# Patient Record
Sex: FEMALE | Race: WHITE | NOT HISPANIC OR LATINO
[De-identification: names, ages, dates, MRNs, and addresses within clinical notes are randomized per-mention and may not be internally consistent; named-entity substitution may affect disease eponyms.]

---

## 2017-03-08 ENCOUNTER — OTHER (OUTPATIENT)
Age: 37
End: 2017-03-08

## 2017-03-15 ENCOUNTER — LABORATORY RESULT (OUTPATIENT)
Age: 37
End: 2017-03-15

## 2017-03-15 ENCOUNTER — APPOINTMENT (OUTPATIENT)
Dept: MATERNAL FETAL MEDICINE | Facility: CLINIC | Age: 37
End: 2017-03-15

## 2017-03-15 ENCOUNTER — OTHER (OUTPATIENT)
Age: 37
End: 2017-03-15

## 2017-03-15 VITALS
HEIGHT: 66 IN | BODY MASS INDEX: 28.64 KG/M2 | SYSTOLIC BLOOD PRESSURE: 132 MMHG | DIASTOLIC BLOOD PRESSURE: 86 MMHG | WEIGHT: 178.2 LBS

## 2017-03-15 DIAGNOSIS — Z00.00 ENCOUNTER FOR GENERAL ADULT MEDICAL EXAMINATION W/OUT ABNORMAL FINDINGS: ICD-10-CM

## 2017-03-15 DIAGNOSIS — Z31.69 ENCOUNTER FOR OTHER GENERAL COUNSELING AND ADVICE ON PROCREATION: ICD-10-CM

## 2017-03-29 ENCOUNTER — APPOINTMENT (OUTPATIENT)
Dept: ULTRASOUND IMAGING | Facility: HOSPITAL | Age: 37
End: 2017-03-29

## 2017-03-29 ENCOUNTER — OUTPATIENT (OUTPATIENT)
Dept: OUTPATIENT SERVICES | Facility: HOSPITAL | Age: 37
LOS: 1 days | End: 2017-03-29
Payer: COMMERCIAL

## 2017-03-29 DIAGNOSIS — Z31.69 ENCOUNTER FOR OTHER GENERAL COUNSELING AND ADVICE ON PROCREATION: ICD-10-CM

## 2017-03-29 PROCEDURE — 58340 CATHETER FOR HYSTEROGRAPHY: CPT

## 2017-03-29 PROCEDURE — 76831 ECHO EXAM UTERUS: CPT

## 2017-05-24 LAB
ALBUMIN SERPL ELPH-MCNC: 4.6 G/DL
ALP BLD-CCNC: 64 U/L
ALT SERPL-CCNC: 11 U/L
ANION GAP SERPL CALC-SCNC: 16 MMOL/L
AST SERPL-CCNC: 15 U/L
BASOPHILS # BLD AUTO: 0.04 K/UL
BASOPHILS NFR BLD AUTO: 0.3 %
BILIRUB SERPL-MCNC: 0.3 MG/DL
BUN SERPL-MCNC: 15 MG/DL
CALCIUM SERPL-MCNC: 9.9 MG/DL
CHLORIDE SERPL-SCNC: 103 MMOL/L
CO2 SERPL-SCNC: 20 MMOL/L
CREAT SERPL-MCNC: 0.78 MG/DL
EOSINOPHIL # BLD AUTO: 0.61 K/UL
EOSINOPHIL NFR BLD AUTO: 5.2 %
GLUCOSE SERPL-MCNC: 83 MG/DL
HBV SURFACE AG SER QL: NONREACTIVE
HCT VFR BLD CALC: 42.5 %
HCV AB SER QL: NONREACTIVE
HCV S/CO RATIO: 0.09 S/CO
HGB BLD-MCNC: 14.6 G/DL
HIV1+2 AB SPEC QL IA.RAPID: NONREACTIVE
IMM GRANULOCYTES NFR BLD AUTO: 0.1 %
LYMPHOCYTES # BLD AUTO: 4.23 K/UL
LYMPHOCYTES NFR BLD AUTO: 36.3 %
MAN DIFF?: NORMAL
MCHC RBC-ENTMCNC: 29.7 PG
MCHC RBC-ENTMCNC: 34.4 GM/DL
MCV RBC AUTO: 86.6 FL
MEV IGG FLD QL IA: 17.9 AU/ML
MEV IGG+IGM SER-IMP: NEGATIVE
MONOCYTES # BLD AUTO: 0.85 K/UL
MONOCYTES NFR BLD AUTO: 7.3 %
MUV AB SER-ACNC: POSITIVE
MUV IGG SER QL IA: 13.5 AU/ML
NEUTROPHILS # BLD AUTO: 5.91 K/UL
NEUTROPHILS NFR BLD AUTO: 50.8 %
PLATELET # BLD AUTO: 287 K/UL
POTASSIUM SERPL-SCNC: 4.3 MMOL/L
PROT SERPL-MCNC: 7.3 G/DL
RBC # BLD: 4.91 M/UL
RBC # FLD: 13.3 %
RPR SER-TITR: NORMAL
RUBV IGG FLD-ACNC: 1.1 INDEX
RUBV IGG SER-IMP: POSITIVE
SODIUM SERPL-SCNC: 138 MMOL/L
TSH SERPL-ACNC: 1.78 UIU/ML
VZV AB TITR SER: POSITIVE
VZV IGG SER IF-ACNC: 404.1 INDEX
WBC # FLD AUTO: 11.65 K/UL

## 2017-11-13 ENCOUNTER — EMERGENCY (EMERGENCY)
Facility: HOSPITAL | Age: 37
LOS: 1 days | Discharge: HOME | End: 2017-11-13
Attending: EMERGENCY MEDICINE | Admitting: EMERGENCY MEDICINE
Payer: COMMERCIAL

## 2017-11-13 VITALS
DIASTOLIC BLOOD PRESSURE: 102 MMHG | OXYGEN SATURATION: 100 % | HEART RATE: 86 BPM | RESPIRATION RATE: 18 BRPM | SYSTOLIC BLOOD PRESSURE: 149 MMHG | TEMPERATURE: 98 F

## 2017-11-13 VITALS — HEART RATE: 74 BPM | DIASTOLIC BLOOD PRESSURE: 80 MMHG | SYSTOLIC BLOOD PRESSURE: 115 MMHG

## 2017-11-13 LAB
ALBUMIN SERPL ELPH-MCNC: 5 G/DL — SIGNIFICANT CHANGE UP (ref 3.3–5)
ALP SERPL-CCNC: 56 U/L — SIGNIFICANT CHANGE UP (ref 40–120)
ALT FLD-CCNC: 15 U/L RC — SIGNIFICANT CHANGE UP (ref 10–45)
ANION GAP SERPL CALC-SCNC: 14 MMOL/L — SIGNIFICANT CHANGE UP (ref 5–17)
AST SERPL-CCNC: 22 U/L — SIGNIFICANT CHANGE UP (ref 10–40)
BILIRUB SERPL-MCNC: 0.4 MG/DL — SIGNIFICANT CHANGE UP (ref 0.2–1.2)
BUN SERPL-MCNC: 11 MG/DL — SIGNIFICANT CHANGE UP (ref 7–23)
CALCIUM SERPL-MCNC: 10 MG/DL — SIGNIFICANT CHANGE UP (ref 8.4–10.5)
CHLORIDE SERPL-SCNC: 104 MMOL/L — SIGNIFICANT CHANGE UP (ref 96–108)
CO2 SERPL-SCNC: 23 MMOL/L — SIGNIFICANT CHANGE UP (ref 22–31)
CREAT SERPL-MCNC: 0.76 MG/DL — SIGNIFICANT CHANGE UP (ref 0.5–1.3)
GLUCOSE SERPL-MCNC: 84 MG/DL — SIGNIFICANT CHANGE UP (ref 70–99)
HCG UR QL: NEGATIVE — SIGNIFICANT CHANGE UP
POTASSIUM SERPL-MCNC: 4.4 MMOL/L — SIGNIFICANT CHANGE UP (ref 3.5–5.3)
POTASSIUM SERPL-SCNC: 4.4 MMOL/L — SIGNIFICANT CHANGE UP (ref 3.5–5.3)
PROT SERPL-MCNC: 7.4 G/DL — SIGNIFICANT CHANGE UP (ref 6–8.3)
SODIUM SERPL-SCNC: 141 MMOL/L — SIGNIFICANT CHANGE UP (ref 135–145)

## 2017-11-13 PROCEDURE — 99284 EMERGENCY DEPT VISIT MOD MDM: CPT

## 2017-11-13 PROCEDURE — 96375 TX/PRO/DX INJ NEW DRUG ADDON: CPT

## 2017-11-13 PROCEDURE — 80053 COMPREHEN METABOLIC PANEL: CPT

## 2017-11-13 PROCEDURE — 99284 EMERGENCY DEPT VISIT MOD MDM: CPT | Mod: 25

## 2017-11-13 PROCEDURE — 81025 URINE PREGNANCY TEST: CPT

## 2017-11-13 PROCEDURE — 96374 THER/PROPH/DIAG INJ IV PUSH: CPT

## 2017-11-13 RX ORDER — MAGNESIUM SULFATE 500 MG/ML
2 VIAL (ML) INJECTION ONCE
Qty: 0 | Refills: 0 | Status: DISCONTINUED | OUTPATIENT
Start: 2017-11-13 | End: 2017-11-13

## 2017-11-13 RX ORDER — METOCLOPRAMIDE HCL 10 MG
10 TABLET ORAL ONCE
Qty: 0 | Refills: 0 | Status: COMPLETED | OUTPATIENT
Start: 2017-11-13 | End: 2017-11-13

## 2017-11-13 RX ORDER — ACETAMINOPHEN 500 MG
1000 TABLET ORAL ONCE
Qty: 0 | Refills: 0 | Status: COMPLETED | OUTPATIENT
Start: 2017-11-13 | End: 2017-11-13

## 2017-11-13 RX ORDER — SODIUM CHLORIDE 9 MG/ML
2000 INJECTION INTRAMUSCULAR; INTRAVENOUS; SUBCUTANEOUS ONCE
Qty: 0 | Refills: 0 | Status: COMPLETED | OUTPATIENT
Start: 2017-11-13 | End: 2017-11-13

## 2017-11-13 RX ADMIN — SODIUM CHLORIDE 1000 MILLILITER(S): 9 INJECTION INTRAMUSCULAR; INTRAVENOUS; SUBCUTANEOUS at 16:45

## 2017-11-13 RX ADMIN — Medication 400 MILLIGRAM(S): at 16:44

## 2017-11-13 RX ADMIN — Medication 1000 MILLIGRAM(S): at 17:45

## 2017-11-13 RX ADMIN — Medication 10 MILLIGRAM(S): at 16:50

## 2017-11-13 NOTE — ED PROVIDER NOTE - OBJECTIVE STATEMENT
36 yo F hx pre-eclampsia presenting with elevated BP, headaches x 8 d. bilateral frontal headache Associated nausea, no vomiting, "fuzzy" vision but denies blurry/double vision. + photophobia, no phonophobia. + increased stressors in life.    LMP 2 weeks ago, denies OCP use.

## 2017-11-13 NOTE — ED PROVIDER NOTE - CRANIAL NERVE AND PUPILLARY EXAM
sensation of room spinning with lateral EOM/cranial nerves 2-12 intact/central and peripheral vision intact/central vision intact/extra-ocular movements intact

## 2017-11-13 NOTE — ED ADULT NURSE NOTE - OBJECTIVE STATEMENT
37y female presents to ED ambulatory and a/ox3 complaining of headaches and blurry vision. Pt states she started having severe headaches 7 days ago intermittently, starting from the base of the neck and coming towards her forehead, 8/10 non radiating aching pain. Pt states that she has felt very fuzzy and has intermittent nausea, chills and SOB accompanied with headache. Pt states she also has numbness and tingling going down both upper and lower extremities. PERRL, equal strength bilaterally, with sensation present bilaterally. Pt states that she has photophobia and trouble concentrating at work. Pt denies chest pain, denies v/d, denies weakness and dizziness. Pt states she is under stress and going through the grieving process currently. Lenny at bedside, will continue to reassess.

## 2017-11-13 NOTE — ED PROVIDER NOTE - ATTENDING CONTRIBUTION TO CARE
37 yof pmhx post-partum pre-ecclampsia w htn in prior pregnancy (last pregnancy over one year ago), otherwise no hx of chronic htn, presnts w feelign of dizziness, diffuse headache startign in occipital region and radiating upward. no hx of headaches in past besides very mild ones, states this ha started gradually 8 days ago, constant, some improvement w advil. mild blurriness in vision. states felt weak and "nearly passed out." states she lost her youngest child one year ago, and is curently in the proces of adopting a new child -  has been feeling a lot of anger over her child's death, and a lot of unresolved issues with her new adoption.  has been under a lot of stress recently.     ROS:   constitutional - no fever, no chills  eyes - no visual changes, no redness  eent - no sore throat, no nasal congestion  cvs - no chest pain, no leg swelling  resp - no shortness of breath, no cough  gi - no abdominal pain, no vomiting, no diarrhea  gu - no dysuria, no hematuria  msk - no acute back pain, no joint swelling  skin - no rashes, no jaundice  neuro - + headache, no focal weakness  psych - no acute mental health issue     Physical Exam:   constitutional - well appearing, awake and alert, oriented x3  head - no external evidence of trauma  cvs - rrr, no murmurs, no peripheral edema  resp - breath sounds clear and equal bilat  gi - abdomen soft and nontender, no rigidity, guarding or rebound, bowel sounds present  msk - moving all extremities spontaneously  neuro - alert and oriented x3, no focal deficits, CNs 2-12 grossly intact. neg rombergs, no pronator drift, strength 5/5 in all ext. no nystagmus, pupils leatha.   skin- no jaundice, warm and dry  psych - mood and affect wnl, no apparent risk to self or others . mildly tearful when talking about personal life    pt quite HTN in ED - question whether secondary to pain, or whether underlying htn is causing her sxs. HERBERT Richardson MD 37 yof pmhx post-partum  htn after prior pregnancy requiring po meds for a few months post partum (last pregnancy over one year ago), otherwise no hx of chronic htn, presnts w feelign of dizziness, diffuse headache starting in occipital region and radiating upward. no hx of headaches in past besides very mild ones, states this ha started gradually 8 days ago, constant, some improvement w advil. mild blurriness in vision.  felt weak and "nearly passed out." states she lost her youngest child one year ago, and is currently in the process of adopting a new child -  has been feeling a lot of anger over her child's death, and a lot of unresolved issues with her new adoption.  has been under a lot of stress recently and has been looking at a computer screen a lot filling out adoption paperwork. works as a .      ROS:   constitutional - no fever, no chills  eyes - no visual changes, no redness  eent - no sore throat, no nasal congestion  cvs - no chest pain, no leg swelling  resp - no shortness of breath, no cough  gi - no abdominal pain, no vomiting, no diarrhea  gu - no dysuria, no hematuria  msk - no acute back pain, no joint swelling  skin - no rashes, no jaundice  neuro - + headache, no focal weakness  psych - no acute mental health issue     Physical Exam:   constitutional - well appearing, awake and alert, oriented x3  head - no external evidence of trauma  cvs - rrr, no murmurs, no peripheral edema  resp - breath sounds clear and equal bilat  gi - abdomen soft and nontender, no rigidity, guarding or rebound, bowel sounds present  msk - moving all extremities spontaneously  neuro - alert and oriented x3, no focal deficits, CNs 2-12 grossly intact. neg rombergs, no pronator drift, strength 5/5 in all ext. no nystagmus, pupils leatha.   skin- no jaundice, warm and dry  psych - mood and affect wnl, no apparent risk to self or others . mildly tearful when talking about personal life    pt w hypertension in ED - question whether secondary to pain, or whether underlying htn is causing her sxs. no focal neuro deficits to suggest intracranial injury/ acute pathology. no meningismus, change in mental status or fever to suggest infection. iv meds for headache w marked improvement in sxs as well as return to normal of pt's BP. pt states she and her  have a therapist they are seeing to cope w their child's death. counseled to f/u w neuro if ha worsens. additional verbal instructions regarding diagnosis, return precautions and follow up plan given to pt and/or family.  HERBERT Richardson MD

## 2018-03-10 ENCOUNTER — INPATIENT (INPATIENT)
Facility: HOSPITAL | Age: 38
LOS: 0 days | Discharge: HOME | DRG: 777 | End: 2018-03-10
Attending: OBSTETRICS & GYNECOLOGY | Admitting: OBSTETRICS & GYNECOLOGY
Payer: SELF-PAY

## 2018-03-10 ENCOUNTER — TRANSCRIPTION ENCOUNTER (OUTPATIENT)
Age: 38
End: 2018-03-10

## 2018-03-10 ENCOUNTER — RESULT REVIEW (OUTPATIENT)
Age: 38
End: 2018-03-10

## 2018-03-10 VITALS
WEIGHT: 175.05 LBS | HEART RATE: 97 BPM | OXYGEN SATURATION: 97 % | TEMPERATURE: 99 F | SYSTOLIC BLOOD PRESSURE: 132 MMHG | DIASTOLIC BLOOD PRESSURE: 83 MMHG | HEIGHT: 66 IN | RESPIRATION RATE: 18 BRPM

## 2018-03-10 VITALS
SYSTOLIC BLOOD PRESSURE: 128 MMHG | HEART RATE: 87 BPM | DIASTOLIC BLOOD PRESSURE: 79 MMHG | OXYGEN SATURATION: 100 % | RESPIRATION RATE: 18 BRPM

## 2018-03-10 DIAGNOSIS — R10.32 LEFT LOWER QUADRANT PAIN: ICD-10-CM

## 2018-03-10 DIAGNOSIS — Z34.90 ENCOUNTER FOR SUPERVISION OF NORMAL PREGNANCY, UNSPECIFIED, UNSPECIFIED TRIMESTER: ICD-10-CM

## 2018-03-10 LAB
ALBUMIN SERPL ELPH-MCNC: 4.4 G/DL — SIGNIFICANT CHANGE UP (ref 3.3–5)
ALP SERPL-CCNC: 51 U/L — SIGNIFICANT CHANGE UP (ref 40–120)
ALT FLD-CCNC: 12 U/L RC — SIGNIFICANT CHANGE UP (ref 10–45)
ANION GAP SERPL CALC-SCNC: 14 MMOL/L — SIGNIFICANT CHANGE UP (ref 5–17)
APPEARANCE UR: CLEAR — SIGNIFICANT CHANGE UP
APTT BLD: 28.1 SEC — SIGNIFICANT CHANGE UP (ref 27.5–37.4)
AST SERPL-CCNC: 18 U/L — SIGNIFICANT CHANGE UP (ref 10–40)
BACTERIA # UR AUTO: ABNORMAL /HPF
BASE EXCESS BLDV CALC-SCNC: -0.3 MMOL/L — SIGNIFICANT CHANGE UP (ref -2–2)
BASOPHILS # BLD AUTO: 0.1 K/UL — SIGNIFICANT CHANGE UP (ref 0–0.2)
BASOPHILS NFR BLD AUTO: 0.6 % — SIGNIFICANT CHANGE UP (ref 0–2)
BILIRUB SERPL-MCNC: 0.3 MG/DL — SIGNIFICANT CHANGE UP (ref 0.2–1.2)
BILIRUB UR-MCNC: NEGATIVE — SIGNIFICANT CHANGE UP
BLD GP AB SCN SERPL QL: NEGATIVE — SIGNIFICANT CHANGE UP
BUN SERPL-MCNC: 14 MG/DL — SIGNIFICANT CHANGE UP (ref 7–23)
CA-I SERPL-SCNC: 1.17 MMOL/L — SIGNIFICANT CHANGE UP (ref 1.12–1.3)
CALCIUM SERPL-MCNC: 9.9 MG/DL — SIGNIFICANT CHANGE UP (ref 8.4–10.5)
CHLORIDE BLDV-SCNC: 112 MMOL/L — HIGH (ref 96–108)
CHLORIDE SERPL-SCNC: 105 MMOL/L — SIGNIFICANT CHANGE UP (ref 96–108)
CO2 BLDV-SCNC: 26 MMOL/L — SIGNIFICANT CHANGE UP (ref 22–30)
CO2 SERPL-SCNC: 22 MMOL/L — SIGNIFICANT CHANGE UP (ref 22–31)
COLOR SPEC: SIGNIFICANT CHANGE UP
CREAT SERPL-MCNC: 0.75 MG/DL — SIGNIFICANT CHANGE UP (ref 0.5–1.3)
DIFF PNL FLD: ABNORMAL
EOSINOPHIL # BLD AUTO: 0.3 K/UL — SIGNIFICANT CHANGE UP (ref 0–0.5)
EOSINOPHIL NFR BLD AUTO: 2.7 % — SIGNIFICANT CHANGE UP (ref 0–6)
EPI CELLS # UR: SIGNIFICANT CHANGE UP /HPF
GAS PNL BLDV: 133 MMOL/L — LOW (ref 136–145)
GAS PNL BLDV: SIGNIFICANT CHANGE UP
GAS PNL BLDV: SIGNIFICANT CHANGE UP
GLUCOSE BLDV-MCNC: 77 MG/DL — SIGNIFICANT CHANGE UP (ref 70–99)
GLUCOSE SERPL-MCNC: 87 MG/DL — SIGNIFICANT CHANGE UP (ref 70–99)
GLUCOSE UR QL: NEGATIVE — SIGNIFICANT CHANGE UP
HCG SERPL-ACNC: 4595 MIU/ML — HIGH (ref 5–24)
HCO3 BLDV-SCNC: 25 MMOL/L — SIGNIFICANT CHANGE UP (ref 21–29)
HCT VFR BLD CALC: 41.8 % — SIGNIFICANT CHANGE UP (ref 34.5–45)
HCT VFR BLDA CALC: 44 % — SIGNIFICANT CHANGE UP (ref 39–50)
HGB BLD CALC-MCNC: 14.5 G/DL — SIGNIFICANT CHANGE UP (ref 11.5–15.5)
HGB BLD-MCNC: 14.4 G/DL — SIGNIFICANT CHANGE UP (ref 11.5–15.5)
INR BLD: 1.04 RATIO — SIGNIFICANT CHANGE UP (ref 0.88–1.16)
KETONES UR-MCNC: NEGATIVE — SIGNIFICANT CHANGE UP
LACTATE BLDV-MCNC: 1.1 MMOL/L — SIGNIFICANT CHANGE UP (ref 0.7–2)
LEUKOCYTE ESTERASE UR-ACNC: NEGATIVE — SIGNIFICANT CHANGE UP
LYMPHOCYTES # BLD AUTO: 3.3 K/UL — SIGNIFICANT CHANGE UP (ref 1–3.3)
LYMPHOCYTES # BLD AUTO: 31.8 % — SIGNIFICANT CHANGE UP (ref 13–44)
MCHC RBC-ENTMCNC: 30.8 PG — SIGNIFICANT CHANGE UP (ref 27–34)
MCHC RBC-ENTMCNC: 34.5 GM/DL — SIGNIFICANT CHANGE UP (ref 32–36)
MCV RBC AUTO: 89.2 FL — SIGNIFICANT CHANGE UP (ref 80–100)
MONOCYTES # BLD AUTO: 0.9 K/UL — SIGNIFICANT CHANGE UP (ref 0–0.9)
MONOCYTES NFR BLD AUTO: 8.2 % — SIGNIFICANT CHANGE UP (ref 2–14)
NEUTROPHILS # BLD AUTO: 5.9 K/UL — SIGNIFICANT CHANGE UP (ref 1.8–7.4)
NEUTROPHILS NFR BLD AUTO: 56.6 % — SIGNIFICANT CHANGE UP (ref 43–77)
NITRITE UR-MCNC: NEGATIVE — SIGNIFICANT CHANGE UP
OTHER CELLS CSF MANUAL: 8 ML/DL — LOW (ref 18–22)
PCO2 BLDV: 44 MMHG — SIGNIFICANT CHANGE UP (ref 35–50)
PH BLDV: 7.37 — SIGNIFICANT CHANGE UP (ref 7.35–7.45)
PH UR: 6.5 — SIGNIFICANT CHANGE UP (ref 5–8)
PLATELET # BLD AUTO: 271 K/UL — SIGNIFICANT CHANGE UP (ref 150–400)
PO2 BLDV: 24 MMHG — LOW (ref 25–45)
POTASSIUM BLDV-SCNC: 4.7 MMOL/L — SIGNIFICANT CHANGE UP (ref 3.5–5)
POTASSIUM SERPL-MCNC: 3.9 MMOL/L — SIGNIFICANT CHANGE UP (ref 3.5–5.3)
POTASSIUM SERPL-SCNC: 3.9 MMOL/L — SIGNIFICANT CHANGE UP (ref 3.5–5.3)
PROT SERPL-MCNC: 7.5 G/DL — SIGNIFICANT CHANGE UP (ref 6–8.3)
PROT UR-MCNC: NEGATIVE — SIGNIFICANT CHANGE UP
PROTHROM AB SERPL-ACNC: 11.3 SEC — SIGNIFICANT CHANGE UP (ref 9.8–12.7)
RBC # BLD: 4.69 M/UL — SIGNIFICANT CHANGE UP (ref 3.8–5.2)
RBC # FLD: 11.6 % — SIGNIFICANT CHANGE UP (ref 10.3–14.5)
RBC CASTS # UR COMP ASSIST: SIGNIFICANT CHANGE UP /HPF (ref 0–2)
RH IG SCN BLD-IMP: POSITIVE — SIGNIFICANT CHANGE UP
SAO2 % BLDV: 43 % — LOW (ref 67–88)
SODIUM SERPL-SCNC: 141 MMOL/L — SIGNIFICANT CHANGE UP (ref 135–145)
SP GR SPEC: 1.01 — LOW (ref 1.01–1.02)
UROBILINOGEN FLD QL: NEGATIVE — SIGNIFICANT CHANGE UP
WBC # BLD: 10.4 K/UL — SIGNIFICANT CHANGE UP (ref 3.8–10.5)
WBC # FLD AUTO: 10.4 K/UL — SIGNIFICANT CHANGE UP (ref 3.8–10.5)
WBC UR QL: SIGNIFICANT CHANGE UP /HPF (ref 0–5)

## 2018-03-10 PROCEDURE — 85610 PROTHROMBIN TIME: CPT

## 2018-03-10 PROCEDURE — 99285 EMERGENCY DEPT VISIT HI MDM: CPT | Mod: 25

## 2018-03-10 PROCEDURE — 86901 BLOOD TYPING SEROLOGIC RH(D): CPT

## 2018-03-10 PROCEDURE — 99285 EMERGENCY DEPT VISIT HI MDM: CPT

## 2018-03-10 PROCEDURE — 86900 BLOOD TYPING SEROLOGIC ABO: CPT

## 2018-03-10 PROCEDURE — 76817 TRANSVAGINAL US OBSTETRIC: CPT | Mod: 26

## 2018-03-10 PROCEDURE — 86850 RBC ANTIBODY SCREEN: CPT

## 2018-03-10 PROCEDURE — 76817 TRANSVAGINAL US OBSTETRIC: CPT

## 2018-03-10 PROCEDURE — 80053 COMPREHEN METABOLIC PANEL: CPT

## 2018-03-10 PROCEDURE — 84702 CHORIONIC GONADOTROPIN TEST: CPT

## 2018-03-10 PROCEDURE — 82947 ASSAY GLUCOSE BLOOD QUANT: CPT

## 2018-03-10 PROCEDURE — 85730 THROMBOPLASTIN TIME PARTIAL: CPT

## 2018-03-10 PROCEDURE — 82330 ASSAY OF CALCIUM: CPT

## 2018-03-10 PROCEDURE — 85014 HEMATOCRIT: CPT

## 2018-03-10 PROCEDURE — 84295 ASSAY OF SERUM SODIUM: CPT

## 2018-03-10 PROCEDURE — 93975 VASCULAR STUDY: CPT | Mod: 26

## 2018-03-10 PROCEDURE — 93975 VASCULAR STUDY: CPT

## 2018-03-10 PROCEDURE — 88305 TISSUE EXAM BY PATHOLOGIST: CPT | Mod: 26

## 2018-03-10 PROCEDURE — 82803 BLOOD GASES ANY COMBINATION: CPT

## 2018-03-10 PROCEDURE — 81001 URINALYSIS AUTO W/SCOPE: CPT

## 2018-03-10 PROCEDURE — 83605 ASSAY OF LACTIC ACID: CPT

## 2018-03-10 PROCEDURE — 84132 ASSAY OF SERUM POTASSIUM: CPT

## 2018-03-10 PROCEDURE — 82435 ASSAY OF BLOOD CHLORIDE: CPT

## 2018-03-10 PROCEDURE — 88305 TISSUE EXAM BY PATHOLOGIST: CPT

## 2018-03-10 PROCEDURE — 85027 COMPLETE CBC AUTOMATED: CPT

## 2018-03-10 RX ORDER — ONDANSETRON 8 MG/1
4 TABLET, FILM COATED ORAL ONCE
Qty: 0 | Refills: 0 | Status: DISCONTINUED | OUTPATIENT
Start: 2018-03-10 | End: 2018-03-10

## 2018-03-10 RX ORDER — OXYCODONE HYDROCHLORIDE 5 MG/1
1 TABLET ORAL
Qty: 8 | Refills: 0 | OUTPATIENT
Start: 2018-03-10 | End: 2018-03-11

## 2018-03-10 RX ORDER — SODIUM CHLORIDE 9 MG/ML
1000 INJECTION, SOLUTION INTRAVENOUS
Qty: 0 | Refills: 0 | Status: DISCONTINUED | OUTPATIENT
Start: 2018-03-10 | End: 2018-03-10

## 2018-03-10 RX ORDER — SODIUM CHLORIDE 9 MG/ML
1000 INJECTION INTRAMUSCULAR; INTRAVENOUS; SUBCUTANEOUS ONCE
Qty: 0 | Refills: 0 | Status: COMPLETED | OUTPATIENT
Start: 2018-03-10 | End: 2018-03-10

## 2018-03-10 RX ORDER — HYDROMORPHONE HYDROCHLORIDE 2 MG/ML
0.5 INJECTION INTRAMUSCULAR; INTRAVENOUS; SUBCUTANEOUS
Qty: 0 | Refills: 0 | Status: DISCONTINUED | OUTPATIENT
Start: 2018-03-10 | End: 2018-03-10

## 2018-03-10 RX ORDER — ACETAMINOPHEN 500 MG
650 TABLET ORAL ONCE
Qty: 0 | Refills: 0 | Status: COMPLETED | OUTPATIENT
Start: 2018-03-10 | End: 2018-03-10

## 2018-03-10 RX ADMIN — SODIUM CHLORIDE 4000 MILLILITER(S): 9 INJECTION INTRAMUSCULAR; INTRAVENOUS; SUBCUTANEOUS at 12:05

## 2018-03-10 RX ADMIN — Medication 650 MILLIGRAM(S): at 12:06

## 2018-03-10 NOTE — ED PROVIDER NOTE - PHYSICAL EXAMINATION
Gen: NAD, AOx3  Head: NCAT  HEENT: PERRL, oral mucosa moist, normal conjunctiva, neck supple  Lung: CTAB, no respiratory distress  CV: rrr, no murmur, Normal perfusion  Abd: soft, +RLQ/suprapubic/LLQ ttp without rebound/guarding, no CVA ttp  MSK: No edema, no visible deformities  Neuro: No focal neurologic deficits  Skin: No rash   Psych: normal affect

## 2018-03-10 NOTE — ASU DISCHARGE PLAN (ADULT/PEDIATRIC). - ACTIVITY LEVEL
no sports/gym/no exercise/nothing per rectum/no tampons/no tub baths/nothing per vagina/no heavy lifting/no weight bearing/quiet play/no douching/no intercourse

## 2018-03-10 NOTE — ED PROVIDER NOTE - ATTENDING CONTRIBUTION TO CARE
Dr. Ochoa (Attending Physician)  I performed a history and physical exam of the patient and discussed their management with the resident. I reviewed the resident's note and agree with the documented findings and plan of care. My medical decision making and observations are found above.

## 2018-03-10 NOTE — CONSULT NOTE ADULT - SUBJECTIVE AND OBJECTIVE BOX
37y  LMP 18 c/o abdominal pain that began_____. Patient has been following with Ob/Gyn for pregnancy of unknown locations.  Hc (3/5)-> 3519 (3/8) -> 4392 (3/9)  TVUS performed on 3/9 demonstrated right ovarian complex cyst 1.3 x 1.1 x 1.3cm, thickened endometrium, no free fluid, no adnexal masses.      Ob=Dr. Padilla    OB/GYN HISTORY:  -TOP, -pLTCS, 2016-rLTCS @35 weeks- c/b vasa previa in 3rd trimester (infant  suddenly at home @ 7 weeks)  PAST MEDICAL & SURGICAL HISTORY:  Hypertension in pregnancy  No Past Medical History  No Past Surgical History    Allergies    No Known Allergies    Intolerances      MEDICATIONS  (STANDING):    MEDICATIONS  (PRN):    FAMILY HISTORY:  No pertinent family history in first degree relatives    SOCIAL HISTORY:      Vital Signs Last 24 Hrs  T(C): --  T(F): --  HR: --  BP: --  BP(mean): --  RR: --  SpO2: --    PHYSICAL EXAM:            LABS:                    RADIOLOGY & ADDITIONAL STUDIES: 37y  LMP 18 c/o abdominal pain that began_____. Patient has been following with Ob/Gyn for pregnancy of unknown location.  Hc (3/5)-> 3519 (3/8) -> 4392 (3/9)  TVUS performed on 3/9 demonstrated right ovarian complex cyst 1.3 x 1.1 x 1.3cm, thickened endometrium, no free fluid, no adnexal masses.      Ob=Dr. Padilla    OB/GYN HISTORY:  -TOP, -pLTCS, 2016-rLTCS @35 weeks- c/b vasa previa in 3rd trimester (infant  suddenly at home @ 7 weeks)  PAST MEDICAL & SURGICAL HISTORY:  Hypertension in pregnancy  No Past Medical History  No Past Surgical History    Allergies    No Known Allergies    Intolerances      MEDICATIONS  (STANDING):    MEDICATIONS  (PRN):    FAMILY HISTORY:  No pertinent family history in first degree relatives    SOCIAL HISTORY:      Vital Signs Last 24 Hrs  T(C): --  T(F): --  HR: --  BP: --  BP(mean): --  RR: --  SpO2: --    PHYSICAL EXAM:            LABS:                    RADIOLOGY & ADDITIONAL STUDIES: 37y  LMP 18 c/o abdominal pain that began_____. Patient has been following with Ob/Gyn for pregnancy of unknown location.  Hc (3/5)-> 3519 (3/8) -> 4392 (3/9)  TVUS performed on 3/9 demonstrated right ovarian complex cyst 1.3 x 1.1 x 1.3cm, thickened endometrium, no free fluid, no adnexal masses.      Ob=Dr. Padilla    OB/GYN HISTORY:  -TOP, -pLTCS, 2016-rLTCS @35 weeks- c/b vasa previa in 3rd trimester (infant  suddenly at home @ 7 weeks)  PAST MEDICAL & SURGICAL HISTORY:  Hypertension in pregnancy  No Past Medical History  No Past Surgical History    Allergies    No Known Allergies    Intolerances      MEDICATIONS  (STANDING):    MEDICATIONS  (PRN):    FAMILY HISTORY:  No pertinent family history in first degree relatives    SOCIAL HISTORY:      Vital Signs Last 24 Hrs  ICU Vital Signs Last 24 Hrs  T(C): 37.4 (10 Mar 2018 10:59), Max: 37.4 (10 Mar 2018 10:59)  T(F): 99.3 (10 Mar 2018 10:59), Max: 99.3 (10 Mar 2018 10:59)  HR: 97 (10 Mar 2018 10:59) (97 - 97)  BP: 132/83 (10 Mar 2018 10:59) (132/83 - 132/83)  BP(mean): --  ABP: --  ABP(mean): --  RR: 18 (10 Mar 2018 10:59) (18 - 18)  SpO2: 97% (10 Mar 2018 10:59) (97% - 97%)      PHYSICAL EXAM:            LABS:                    RADIOLOGY & ADDITIONAL STUDIES: 37y  LMP 18 w/pregnancy of unknown location. Patient is c/o worsening abdominal pain x 1 week. States she began having 1 week of RLQ pain, that radiated across to her LLQ. She woke up this morning with severe Left back pain. Patient has been following with Ob/Gyn:  Hc (3/5)-> 3519 (3/8) -> 4392 (3/9)  TVUS performed on 3/9 demonstrated right ovarian complex cyst 1.3 x 1.1 x 1.3cm, thickened endometrium, no free fluid, no adnexal masses.  States she had 1 episode of vb last night, has been spotting x 1 week. Reports some lightheadedness and nausea. Denies vomting/CP/SOB/fevers/chills.        Ob=Dr. Padilla    OB/GYN HISTORY:  -TOP, -pLTCS, 2016-rLTCS @35 weeks- c/b vasa previa in 3rd trimester (infant  suddenly at home @ 7 weeks)  PAST MEDICAL & SURGICAL HISTORY:  Hypertension in pregnancy  No Past Medical History  No Past Surgical History    Allergies    No Known Allergies    Intolerances      MEDICATIONS  (STANDING):    MEDICATIONS  (PRN):    FAMILY HISTORY:  No pertinent family history in first degree relatives    SOCIAL HISTORY:      Vital Signs Last 24 Hrs  ICU Vital Signs Last 24 Hrs  T(C): 37.4 (10 Mar 2018 10:59), Max: 37.4 (10 Mar 2018 10:59)  T(F): 99.3 (10 Mar 2018 10:59), Max: 99.3 (10 Mar 2018 10:59)  HR: 97 (10 Mar 2018 10:59) (97 - 97)  BP: 132/83 (10 Mar 2018 10:59) (132/83 - 132/83)  BP(mean): --  ABP: --  ABP(mean): --  RR: 18 (10 Mar 2018 10:59) (18 - 18)  SpO2: 97% (10 Mar 2018 10:59) (97% - 97%)      PHYSICAL EXAM:  Physical Exam:   General: sitting comftorably in bed, NAD   HEENT: neck supple, full ROM  CV: RR S1S2 no m/r/g  Lungs: CTA b/l, good air flow b/l   Back: No CVA tenderness  Abd: soft, +RLQ/suprapubic/LLQ tender to palpation without rebound/guarding, no CVA   Ext: non-tender b/l, no edema             LABS:                    RADIOLOGY & ADDITIONAL STUDIES:

## 2018-03-10 NOTE — BRIEF OPERATIVE NOTE - PROCEDURE
<<-----Click on this checkbox to enter Procedure Left salpingectomy for ectopic pregnancy  03/10/2018    Active  DAMIEN

## 2018-03-10 NOTE — ED PROVIDER NOTE - CARE PLAN
Principal Discharge DX:	Left lower quadrant pain  Secondary Diagnosis:	Pregnancy of unknown anatomic location

## 2018-03-10 NOTE — H&P ADULT - ASSESSMENT
37y  here w/ c/o abdominal pain with pregnancy of unknown location  ,Patient is clinically and hemodynamically stable however with tenderness on exam to palpation of abdomen. Given symptom of abdominal pain and tenderness on exam,  Will prepare patient for OR for diagnostic laparoscopy and unilateral salpingectomy.        Neuro: IV pain medication prn  CV: atient hemodynamically stable- will continue to monitor vitals closely.   Pulm: saturating well on room air   GI: NPO for OR   : Blake to be placed intra-operatively.   Reproductive: Ectopic pregnancy (not candidate for medical therapy):  patient to go to OR for diagnostic laparoscopy, unilateral salpingectomy, possible unilateral oopherectomy, possible exploratory laparotomy.  Patient counseled on risks of surgery including bleeding, infection and damage to surrounding organs.  All questions/concerns of patient addressed. All consents signed with patient.    Heme: SCD's in OR for DVT ppx.  Aggressive and early ambulation post-operatively for DVT ppx.   ID: afebrile   FEN: LR@125.  Replete electolytes prn   Dispo: To OR for procedure as detailed above    SALVADOR Green, PGY2 37y  here w/ c/o abdominal pain with pregnancy of unknown location. Patient is clinically and hemodynamically stable however with tenderness on exam to palpation of abdomen. Given symptom of abdominal pain and tenderness on exam,- will prepare patient for OR for diagnostic laparoscopy and unilateral salpingectomy.        Neuro: IV pain medication prn  CV: atient hemodynamically stable- will continue to monitor vitals closely.   Pulm: saturating well on room air   GI: NPO for OR   : Blake to be placed intra-operatively.   Reproductive: Ectopic pregnancy (not candidate for medical therapy):  patient to go to OR for diagnostic laparoscopy, unilateral salpingectomy,possible dilation and curretage  Patient counseled on risks of surgery including bleeding, infection and damage to surrounding organs.  All questions/concerns of patient addressed. All consents signed with patient.    Heme: SCD's in OR for DVT ppx.  Aggressive and early ambulation post-operatively for DVT ppx.   ID: afebrile   FEN: LR@125.  Replete electolytes prn   Dispo: To OR for procedure as detailed above    SALVADOR Green, PGY2  #[id number] 37y  here w/ c/o abdominal pain with pregnancy of unknown location. Patient is clinically and hemodynamically stable however with tenderness on exam to palpation of abdomen. Given symptom of abdominal pain and tenderness on exam,- will prepare patient for OR for diagnostic laparoscopy and unilateral salpingectomy.

## 2018-03-10 NOTE — ED ADULT NURSE REASSESSMENT NOTE - NS ED NURSE REASSESS COMMENT FT1
Report was given to RNMirian in same day surgery for this pt. Pt's vitals stable and recorded in chart. Will continue to monitor pt awaiting admission.

## 2018-03-10 NOTE — ASU DISCHARGE PLAN (ADULT/PEDIATRIC). - MEDICATION SUMMARY - MEDICATIONS TO TAKE
I will START or STAY ON the medications listed below when I get home from the hospital:    Tylenol 325 mg oral tablet  -- 2 tab(s) by mouth every 4 hours  -- Indication: For Pain    Motrin 600 mg oral tablet  -- 1 tab(s) by mouth every 6 hours  -- Indication: For Pain    oxyCODONE 5 mg oral tablet  -- 1 tab(s) by mouth every 6 hours MDD:4  -- Caution federal law prohibits the transfer of this drug to any person other  than the person for whom it was prescribed.  It is very important that you take or use this exactly as directed.  Do not skip doses or discontinue unless directed by your doctor.  May cause drowsiness.  Alcohol may intensify this effect.  Use care when operating dangerous machinery.  This prescription cannot be refilled.  Using more of this medication than prescribed may cause serious breathing problems.    -- Indication: For Pain

## 2018-03-10 NOTE — H&P ADULT - HISTORY OF PRESENT ILLNESS
37y  LMP 18 w/pregnancy of unknown location. Patient is c/o worsening abdominal pain x 1 week. States she began having 1 week of RLQ pain, that radiated across to her LLQ. She woke up this morning with severe Left back pain. Patient has been following with Ob/Gyn:  Hc (3/5)-> 3519 (3/8) -> 4392 (3/9)  TVUS performed on 3/9 demonstrated right ovarian complex cyst 1.3 x 1.1 x 1.3cm, thickened endometrium, no free fluid, no adnexal masses.  States she had 1 episode of vb last night, has been spotting x 1 week. Reports some lightheadedness and nausea. Denies vomiting/CP/SOB/fevers/chills.        Ob=Dr. Padilla    OB/GYN HISTORY:  -TOP, -pLTCS, 2016-rLTCS @35 weeks- c/b vasa previa in 3rd trimester (infant  suddenly at home @ 7 weeks)

## 2018-03-10 NOTE — BRIEF OPERATIVE NOTE - OPERATION/FINDINGS
Left ectopic pregnancy, omental adhesions to anterior abdominal wall. Grossly normal uterus, right tube and ovary and left ovary

## 2018-03-10 NOTE — ED ADULT NURSE NOTE - OBJECTIVE STATEMENT
38 y/o F, reported to ED from home. A&Ox3, c/o lower back pain. Pt reports that she woke up left lower back pain. Pt reports that her pain is 10+/10. Pt reports taking 2 Tylenol for pain at 4am. Pt  1 week ago left sided dull pain intermittent.    36 y/o F, reported to ED from home. A&Ox3, c/o lower back pain. Pt is 6 weeks pregnant, . Pt reports that she woke up left lower back pain. Pt reports that her pain is 10+/10. Pt reports taking 2 Tylenol for pain at 4am. Pt reports that the pain is sharp and intense today. Pt reports more pain in left lower back with palpation. Pt reports radiation of pain to the right side. Pt reports some sediment in her urine x1day. Pt reports some bleeding noticed when she wipes for the past week. Pt denies dysuria. Pt reports abd pain with palpation in the LLQ. Pt reports 1 week ago left sided dull pain intermittent. Pt reports that she has been following up with her OBGYN and was told that they are unable to visualize the pregnancy in the uterus. Pt states "my OBGYN wanted me to come to the ED to get this checked out." Pt denies LOC, H/A, SOB, C/P, N/V, fever or chills.

## 2018-03-10 NOTE — ED PROVIDER NOTE - OBJECTIVE STATEMENT
38yo F  LMP , +pregnancy test with 3 US of pregnancy of unknown origin. has been having 1 week of right lower quadrant pain dull achy, now progressing to radiate across abdomen to left lower quadrant. US yesterday no IUP but hcg going up. woke up today with severe Lt back pain radiates across back to rt side. constant. no h/o kidney stones. no urinary sx. had some spotting today. no shoulder pain. no CP/SOB/dizziness. no fever/chills. no nausea/vomiting. loose stool last few days. no sick contacts/travel.     OB- Yoana

## 2018-03-10 NOTE — CONSULT NOTE ADULT - ASSESSMENT
39yo  LMP 18 w/abdominal pain and pregnancy of unknown location, r/o ectopic pregnancy.    -bHCG, CBC, CMP, T+S, coags  -NPO  -TVUS    TBS by NOHEMI Green, PGY2  #[id number]  Spectra [phone] 37yo  LMP 18 w/abdominal pain and pregnancy of unknown location, r/o ectopic pregnancy.    -bHCG, CBC, CMP, T+S, coags  -NPO  -TVUS        SALVADOR Green, PGY2  #[id number]  Spectra [phone]  D/W Dr. Padilla

## 2018-03-10 NOTE — ASU DISCHARGE PLAN (ADULT/PEDIATRIC). - NOTIFY
Pain not relieved by Medications/Fever greater than 101/GYN Fever>100.4/Increased Irritability or Sluggishness/Numbness, tingling/Inability to Tolerate Liquids or Foods/Persistent Nausea and Vomiting/Unable to Urinate/Excessive Diarrhea/Bleeding that does not stop/Numbness, color, or temperature change to extremity/Swelling that continues

## 2018-03-10 NOTE — ED ADULT TRIAGE NOTE - CHIEF COMPLAINT QUOTE
left lower back pain with vaginal staining, 6 weeks pregnant left lower back pain with vaginal staining, 6 weeks pregnant, possible ectopic pregnancy

## 2018-03-10 NOTE — ED ADULT NURSE REASSESSMENT NOTE - NS ED NURSE REASSESS COMMENT FT1
Pt tolerating IV fluids well. Pt treated with PO Tylenol for pain. Sister at bedside, will continue to monitor pt.

## 2018-03-10 NOTE — ED PROVIDER NOTE - MEDICAL DECISION MAKING DETAILS
38yo F with pregnancy of unknown location with worsening abd and back pain, 3 negative US. having some loose stools no other GI/infectious symptoms. patient declined vaginal exam due to frequent US and OB consult doing exam. concern for ectopic. appy on differential but less likely with pain now diffuse lower abd and left back. nephrolithiasis unlikely- pain does not radiate anteriorly and no h/o stones. colitis possible with diarrhea and diffuse lower abd pain. will check labs. TVUS. pain control. hydration. if TVUS may need MRI r/o colitis/appy, or if patient wishes to terminate pregnancy with MTX will obtain CT. Dr. Ochoa (Attending Physician)  38yo F with pregnancy of unknown location with worsening abd and back pain, 3 negative US. having some loose stools no other GI/infectious symptoms. patient declined vaginal exam due to frequent US and OB consult doing exam. concern for ectopic. appy on differential but less likely with pain now diffuse lower abd and left back. nephrolithiasis unlikely- pain does not radiate anteriorly and no h/o stones. colitis possible with diarrhea and diffuse lower abd pain. will check labs. TVUS. pain control. hydration. if TVUS may need MRI r/o colitis/appy, or if patient wishes to terminate pregnancy with MTX will obtain CT.

## 2018-03-10 NOTE — H&P ADULT - ATTENDING COMMENTS
Pt seen and examined. Abdominal pain worsened since last exam in office.  BHCG not rising appropriately; +staining; +abdominal pain.  Given these findings, plan for diagnostic laparoscopy, possible removal of ectopic pregnancy, possible salpingectomy, possible D&C.  Risks and benefits discussed with the patient, her spouse and her sister.  All questions answered.  They agree with the plan as above.

## 2018-03-10 NOTE — ED PROVIDER NOTE - NS ED ROS FT
ROS: no CP/SOB. no cough. no fever. no n/v/d/c. +abd pain. no rash. +bleeding. no urinary complaints. no weakness. no vision changes. no HA. +back pain. no extremity swelling/deformity. No change in mental status.

## 2018-03-10 NOTE — H&P ADULT - NSHPLABSRESULTS_GEN_ALL_CORE
Vital Signs Last 24 Hrs  T(C): 36.9 (10 Mar 2018 11:13), Max: 37.4 (10 Mar 2018 10:59)  T(F): 98.5 (10 Mar 2018 11:13), Max: 99.3 (10 Mar 2018 10:59)  HR: 99 (10 Mar 2018 11:13) (97 - 99)  BP: 145/94 (10 Mar 2018 11:13) (132/83 - 145/94)  BP(mean): --  RR: 18 (10 Mar 2018 11:13) (18 - 18)  SpO2: 100% (10 Mar 2018 11:13) (97% - 100%)    I&O's Detail                            14.4   10.4  )-----------( 271      ( 10 Mar 2018 11:52 )             41.8       03-10    141  |  105  |  14  ----------------------------<  87  3.9   |  22  |  0.75    Ca    9.9      10 Mar 2018 11:53    TPro  7.5  /  Alb  4.4  /  TBili  0.3  /  DBili  x   /  AST  18  /  ALT  12  /  AlkPhos  51  03-10      CAPILLARY BLOOD GLUCOSE          LIVER FUNCTIONS - ( 10 Mar 2018 11:53 )  Alb: 4.4 g/dL / Pro: 7.5 g/dL / ALK PHOS: 51 U/L / ALT: 12 U/L RC / AST: 18 U/L / GGT: x             PT/INR - ( 10 Mar 2018 11:52 )   PT: 11.3 sec;   INR: 1.04 ratio         PTT - ( 10 Mar 2018 11:52 )  PTT:28.1 sec    Urinalysis Basic - ( 10 Mar 2018 12:59 )    Color: PL Yellow / Appearance: Clear / S.006 / pH: x  Gluc: x / Ketone: Negative  / Bili: Negative / Urobili: Negative   Blood: x / Protein: Negative / Nitrite: Negative   Leuk Esterase: Negative / RBC: 0-2 /HPF / WBC 0-2 /HPF   Sq Epi: x / Non Sq Epi: Occasional /HPF / Bacteria: Few /HPF    [facility]

## 2018-03-15 LAB — SURGICAL PATHOLOGY STUDY: SIGNIFICANT CHANGE UP

## 2018-03-16 ENCOUNTER — OUTPATIENT (OUTPATIENT)
Dept: OUTPATIENT SERVICES | Facility: HOSPITAL | Age: 38
LOS: 1 days | End: 2018-03-16
Payer: COMMERCIAL

## 2018-03-16 VITALS
SYSTOLIC BLOOD PRESSURE: 116 MMHG | TEMPERATURE: 98 F | OXYGEN SATURATION: 99 % | HEIGHT: 66 IN | WEIGHT: 184.97 LBS | HEART RATE: 84 BPM | DIASTOLIC BLOOD PRESSURE: 82 MMHG | RESPIRATION RATE: 16 BRPM

## 2018-03-16 DIAGNOSIS — Z98.891 HISTORY OF UTERINE SCAR FROM PREVIOUS SURGERY: Chronic | ICD-10-CM

## 2018-03-16 DIAGNOSIS — O02.1 MISSED ABORTION: ICD-10-CM

## 2018-03-16 DIAGNOSIS — Z98.890 OTHER SPECIFIED POSTPROCEDURAL STATES: Chronic | ICD-10-CM

## 2018-03-16 LAB
BLD GP AB SCN SERPL QL: NEGATIVE — SIGNIFICANT CHANGE UP
HCT VFR BLD CALC: 41 % — SIGNIFICANT CHANGE UP (ref 34.5–45)
HGB BLD-MCNC: 14 G/DL — SIGNIFICANT CHANGE UP (ref 11.5–15.5)
MCHC RBC-ENTMCNC: 29.3 PG — SIGNIFICANT CHANGE UP (ref 27–34)
MCHC RBC-ENTMCNC: 34.1 GM/DL — SIGNIFICANT CHANGE UP (ref 32–36)
MCV RBC AUTO: 85.8 FL — SIGNIFICANT CHANGE UP (ref 80–100)
NRBC # BLD: 0 /100 WBCS — SIGNIFICANT CHANGE UP (ref 0–0)
PLATELET # BLD AUTO: 298 K/UL — SIGNIFICANT CHANGE UP (ref 150–400)
RBC # BLD: 4.78 M/UL — SIGNIFICANT CHANGE UP (ref 3.8–5.2)
RBC # FLD: 13.3 % — SIGNIFICANT CHANGE UP (ref 10.3–14.5)
RH IG SCN BLD-IMP: POSITIVE — SIGNIFICANT CHANGE UP
WBC # BLD: 13.41 K/UL — HIGH (ref 3.8–10.5)
WBC # FLD AUTO: 13.41 K/UL — HIGH (ref 3.8–10.5)

## 2018-03-16 PROCEDURE — 86900 BLOOD TYPING SEROLOGIC ABO: CPT

## 2018-03-16 PROCEDURE — G0463: CPT

## 2018-03-16 PROCEDURE — 85027 COMPLETE CBC AUTOMATED: CPT

## 2018-03-16 PROCEDURE — 86901 BLOOD TYPING SEROLOGIC RH(D): CPT

## 2018-03-16 PROCEDURE — 86850 RBC ANTIBODY SCREEN: CPT

## 2018-03-16 RX ORDER — SODIUM CHLORIDE 9 MG/ML
3 INJECTION INTRAMUSCULAR; INTRAVENOUS; SUBCUTANEOUS EVERY 8 HOURS
Qty: 0 | Refills: 0 | Status: DISCONTINUED | OUTPATIENT
Start: 2018-03-19 | End: 2018-04-03

## 2018-03-16 RX ORDER — ACETAMINOPHEN 500 MG
2 TABLET ORAL
Qty: 0 | Refills: 0 | COMMUNITY

## 2018-03-16 RX ORDER — LIDOCAINE HCL 20 MG/ML
0.2 VIAL (ML) INJECTION ONCE
Qty: 0 | Refills: 0 | Status: DISCONTINUED | OUTPATIENT
Start: 2018-03-19 | End: 2018-04-03

## 2018-03-16 RX ORDER — ACETAMINOPHEN 500 MG
1000 TABLET ORAL ONCE
Qty: 0 | Refills: 0 | Status: COMPLETED | OUTPATIENT
Start: 2018-03-19 | End: 2018-03-19

## 2018-03-16 RX ORDER — IBUPROFEN 200 MG
1 TABLET ORAL
Qty: 0 | Refills: 0 | COMMUNITY

## 2018-03-16 NOTE — H&P PST ADULT - PMH
Ectopic pregnancy    Hypertension in pregnancy    Missed     Sudden infant death  16 at 7 weeks (boy)

## 2018-03-16 NOTE — H&P PST ADULT - HISTORY OF PRESENT ILLNESS
37y   LMP 18 Reports she is 7 weeks pregnant. C/O abdominal pain x 1 week 3/2/18. States she began having  RLQ pain, that radiated across to her LLQ,   severe Left back pain. Patient has been following with Ob/Gyn:  Hc (3/5)-> 3519 (3/8) -> 4392 (3/9) TVUS performed on 3/9 demonstrated right ovarian complex cyst 1.3 x 1.1 x 1.3cm, thickened endometrium, no free fluid, no adnexal masses. 1 episode of vb 3/9/18 , was spotting x 1 week. Pt went to ER [facility] 3/10/17 S/P Lap left Salpingectomy. Seen by MD 3/16/17 s/p ultrasound and lab tests. Presents Suction Curettage for missed  3/19/18   OB GYN HISTORY:  -TOP, -pLTCS, 2016-rLTCS @35 weeks- c/b vasa previa in 3rd trimester (infant  suddenly at home @ 7 weeks) Adopted  3months ago (boy) 37y   LMP 18 Reports she is 7 weeks pregnant. C/O abdominal pain x 1 week 3/2/18. States she began having  RLQ pain, that radiated across to her LLQ,   severe Left back pain. Patient has been following with Ob/Gyn:  Hc (3/5)-> 3519 (3/8) -> 4392 (3/9) TVUS performed on 3/9 demonstrated right ovarian complex cyst 1.3 x 1.1 x 1.3cm, thickened endometrium, no free fluid, no adnexal masses. 1 episode of vb 3/9/18 , was spotting x 1 week. Pt went to ER [facility] 3/10/17 S/P Lap left Salpingectomy for Ectopic.  Seen by MD 3/16/17 s/p ultrasound and lab tests. Presents Suction Curettage for missed  3/19/18   OB GYN HISTORY:  -TOP, -pLTCS, 2016-rLTCS @35 weeks- c/b vasa previa in 3rd trimester (infant  suddenly at home @ 7 weeks) Adopted  3months ago (boy)

## 2018-03-18 ENCOUNTER — TRANSCRIPTION ENCOUNTER (OUTPATIENT)
Age: 38
End: 2018-03-18

## 2018-03-19 ENCOUNTER — OUTPATIENT (OUTPATIENT)
Dept: OUTPATIENT SERVICES | Facility: HOSPITAL | Age: 38
LOS: 1 days | End: 2018-03-19
Payer: COMMERCIAL

## 2018-03-19 ENCOUNTER — RESULT REVIEW (OUTPATIENT)
Age: 38
End: 2018-03-19

## 2018-03-19 VITALS
SYSTOLIC BLOOD PRESSURE: 125 MMHG | HEART RATE: 70 BPM | DIASTOLIC BLOOD PRESSURE: 74 MMHG | OXYGEN SATURATION: 100 % | RESPIRATION RATE: 20 BRPM

## 2018-03-19 VITALS
SYSTOLIC BLOOD PRESSURE: 118 MMHG | WEIGHT: 184.97 LBS | DIASTOLIC BLOOD PRESSURE: 83 MMHG | TEMPERATURE: 98 F | OXYGEN SATURATION: 100 % | HEART RATE: 76 BPM | HEIGHT: 66 IN | RESPIRATION RATE: 18 BRPM

## 2018-03-19 DIAGNOSIS — Z98.891 HISTORY OF UTERINE SCAR FROM PREVIOUS SURGERY: Chronic | ICD-10-CM

## 2018-03-19 DIAGNOSIS — O02.1 MISSED ABORTION: ICD-10-CM

## 2018-03-19 DIAGNOSIS — Z98.890 OTHER SPECIFIED POSTPROCEDURAL STATES: Chronic | ICD-10-CM

## 2018-03-19 PROCEDURE — 88305 TISSUE EXAM BY PATHOLOGIST: CPT | Mod: 26

## 2018-03-19 PROCEDURE — 88305 TISSUE EXAM BY PATHOLOGIST: CPT

## 2018-03-19 PROCEDURE — 59820 CARE OF MISCARRIAGE: CPT

## 2018-03-19 RX ORDER — CELECOXIB 200 MG/1
200 CAPSULE ORAL ONCE
Qty: 0 | Refills: 0 | Status: COMPLETED | OUTPATIENT
Start: 2018-03-19 | End: 2018-03-19

## 2018-03-19 RX ORDER — ONDANSETRON 8 MG/1
4 TABLET, FILM COATED ORAL ONCE
Qty: 0 | Refills: 0 | Status: DISCONTINUED | OUTPATIENT
Start: 2018-03-19 | End: 2018-04-03

## 2018-03-19 RX ORDER — OXYCODONE HYDROCHLORIDE 5 MG/1
5 TABLET ORAL ONCE
Qty: 0 | Refills: 0 | Status: DISCONTINUED | OUTPATIENT
Start: 2018-03-19 | End: 2018-03-19

## 2018-03-19 RX ORDER — FAMOTIDINE 10 MG/ML
0 INJECTION INTRAVENOUS
Qty: 0 | Refills: 0 | COMMUNITY

## 2018-03-19 RX ORDER — SODIUM CHLORIDE 9 MG/ML
1000 INJECTION, SOLUTION INTRAVENOUS
Qty: 0 | Refills: 0 | Status: DISCONTINUED | OUTPATIENT
Start: 2018-03-19 | End: 2018-04-03

## 2018-03-19 RX ADMIN — CELECOXIB 200 MILLIGRAM(S): 200 CAPSULE ORAL at 07:41

## 2018-03-19 RX ADMIN — Medication 1000 MILLIGRAM(S): at 06:30

## 2018-03-19 RX ADMIN — CELECOXIB 200 MILLIGRAM(S): 200 CAPSULE ORAL at 06:30

## 2018-03-19 NOTE — ASU DISCHARGE PLAN (ADULT/PEDIATRIC). - MEDICATION SUMMARY - MEDICATIONS TO STOP TAKING
I will STOP taking the medications listed below when I get home from the hospital:    famotidine 20 mg oral tablet  -- hs and am of sx

## 2018-03-19 NOTE — BRIEF OPERATIVE NOTE - PROCEDURE
<<-----Click on this checkbox to enter Procedure Suction curettage  03/19/2018    Active  [facility]

## 2018-03-19 NOTE — ASU DISCHARGE PLAN (ADULT/PEDIATRIC). - ACTIVITY LEVEL
no exercise/nothing per vagina/no tub baths/no intercourse/no tampons/no sports/gym/no heavy lifting/no douching

## 2018-03-20 LAB — SURGICAL PATHOLOGY STUDY: SIGNIFICANT CHANGE UP

## 2019-04-07 ENCOUNTER — TRANSCRIPTION ENCOUNTER (OUTPATIENT)
Age: 39
End: 2019-04-07

## 2019-04-07 ENCOUNTER — INPATIENT (INPATIENT)
Facility: HOSPITAL | Age: 39
LOS: 3 days | Discharge: HOME | DRG: 392 | End: 2019-04-11
Attending: GENERAL ACUTE CARE HOSPITAL | Admitting: GENERAL ACUTE CARE HOSPITAL
Payer: COMMERCIAL

## 2019-04-07 VITALS
HEART RATE: 107 BPM | SYSTOLIC BLOOD PRESSURE: 108 MMHG | HEIGHT: 66 IN | TEMPERATURE: 98 F | RESPIRATION RATE: 18 BRPM | WEIGHT: 169.98 LBS | OXYGEN SATURATION: 96 % | DIASTOLIC BLOOD PRESSURE: 83 MMHG

## 2019-04-07 DIAGNOSIS — Z98.890 OTHER SPECIFIED POSTPROCEDURAL STATES: Chronic | ICD-10-CM

## 2019-04-07 DIAGNOSIS — Z98.891 HISTORY OF UTERINE SCAR FROM PREVIOUS SURGERY: Chronic | ICD-10-CM

## 2019-04-07 PROBLEM — O16.9 UNSPECIFIED MATERNAL HYPERTENSION, UNSPECIFIED TRIMESTER: Chronic | Status: ACTIVE | Noted: 2017-11-14

## 2019-04-07 PROBLEM — O02.1 MISSED ABORTION: Chronic | Status: ACTIVE | Noted: 2018-03-16

## 2019-04-07 PROBLEM — O00.90 UNSPECIFIED ECTOPIC PREGNANCY WITHOUT INTRAUTERINE PREGNANCY: Chronic | Status: ACTIVE | Noted: 2018-03-16

## 2019-04-07 LAB
ALBUMIN SERPL ELPH-MCNC: 4.7 G/DL — SIGNIFICANT CHANGE UP (ref 3.3–5)
ALP SERPL-CCNC: 53 U/L — SIGNIFICANT CHANGE UP (ref 40–120)
ALT FLD-CCNC: 20 U/L — SIGNIFICANT CHANGE UP (ref 10–45)
ANION GAP SERPL CALC-SCNC: 16 MMOL/L — SIGNIFICANT CHANGE UP (ref 5–17)
AST SERPL-CCNC: 32 U/L — SIGNIFICANT CHANGE UP (ref 10–40)
BASOPHILS # BLD AUTO: 0 K/UL — SIGNIFICANT CHANGE UP (ref 0–0.2)
BASOPHILS NFR BLD AUTO: 0.1 % — SIGNIFICANT CHANGE UP (ref 0–2)
BILIRUB SERPL-MCNC: 0.5 MG/DL — SIGNIFICANT CHANGE UP (ref 0.2–1.2)
BUN SERPL-MCNC: 14 MG/DL — SIGNIFICANT CHANGE UP (ref 7–23)
CALCIUM SERPL-MCNC: 9.6 MG/DL — SIGNIFICANT CHANGE UP (ref 8.4–10.5)
CHLORIDE SERPL-SCNC: 104 MMOL/L — SIGNIFICANT CHANGE UP (ref 96–108)
CO2 SERPL-SCNC: 20 MMOL/L — LOW (ref 22–31)
CREAT SERPL-MCNC: 0.73 MG/DL — SIGNIFICANT CHANGE UP (ref 0.5–1.3)
EOSINOPHIL # BLD AUTO: 0.1 K/UL — SIGNIFICANT CHANGE UP (ref 0–0.5)
EOSINOPHIL NFR BLD AUTO: 1.2 % — SIGNIFICANT CHANGE UP (ref 0–6)
GLUCOSE SERPL-MCNC: 85 MG/DL — SIGNIFICANT CHANGE UP (ref 70–99)
HCG UR QL: NEGATIVE — SIGNIFICANT CHANGE UP
HCT VFR BLD CALC: 47.3 % — HIGH (ref 34.5–45)
HGB BLD-MCNC: 15.8 G/DL — HIGH (ref 11.5–15.5)
LIDOCAIN IGE QN: 17 U/L — SIGNIFICANT CHANGE UP (ref 7–60)
LYMPHOCYTES # BLD AUTO: 1.2 K/UL — SIGNIFICANT CHANGE UP (ref 1–3.3)
LYMPHOCYTES # BLD AUTO: 11.3 % — LOW (ref 13–44)
MCHC RBC-ENTMCNC: 29.6 PG — SIGNIFICANT CHANGE UP (ref 27–34)
MCHC RBC-ENTMCNC: 33.3 GM/DL — SIGNIFICANT CHANGE UP (ref 32–36)
MCV RBC AUTO: 88.8 FL — SIGNIFICANT CHANGE UP (ref 80–100)
MONOCYTES # BLD AUTO: 0.4 K/UL — SIGNIFICANT CHANGE UP (ref 0–0.9)
MONOCYTES NFR BLD AUTO: 3.9 % — SIGNIFICANT CHANGE UP (ref 2–14)
NEUTROPHILS # BLD AUTO: 8.8 K/UL — HIGH (ref 1.8–7.4)
NEUTROPHILS NFR BLD AUTO: 83.4 % — HIGH (ref 43–77)
PLATELET # BLD AUTO: 294 K/UL — SIGNIFICANT CHANGE UP (ref 150–400)
POTASSIUM SERPL-MCNC: 4.2 MMOL/L — SIGNIFICANT CHANGE UP (ref 3.5–5.3)
POTASSIUM SERPL-SCNC: 4.2 MMOL/L — SIGNIFICANT CHANGE UP (ref 3.5–5.3)
PROT SERPL-MCNC: 7.6 G/DL — SIGNIFICANT CHANGE UP (ref 6–8.3)
RBC # BLD: 5.33 M/UL — HIGH (ref 3.8–5.2)
RBC # FLD: 11.8 % — SIGNIFICANT CHANGE UP (ref 10.3–14.5)
SODIUM SERPL-SCNC: 140 MMOL/L — SIGNIFICANT CHANGE UP (ref 135–145)
WBC # BLD: 10.6 K/UL — HIGH (ref 3.8–10.5)
WBC # FLD AUTO: 10.6 K/UL — HIGH (ref 3.8–10.5)

## 2019-04-07 PROCEDURE — 99218: CPT

## 2019-04-07 PROCEDURE — 76705 ECHO EXAM OF ABDOMEN: CPT | Mod: 26,RT

## 2019-04-07 RX ORDER — ACETAMINOPHEN 500 MG
1000 TABLET ORAL ONCE
Qty: 0 | Refills: 0 | Status: COMPLETED | OUTPATIENT
Start: 2019-04-07 | End: 2019-04-07

## 2019-04-07 RX ORDER — ONDANSETRON 8 MG/1
4 TABLET, FILM COATED ORAL ONCE
Qty: 0 | Refills: 0 | Status: COMPLETED | OUTPATIENT
Start: 2019-04-07 | End: 2019-04-07

## 2019-04-07 RX ORDER — SODIUM CHLORIDE 9 MG/ML
1000 INJECTION, SOLUTION INTRAVENOUS ONCE
Qty: 0 | Refills: 0 | Status: COMPLETED | OUTPATIENT
Start: 2019-04-07 | End: 2019-04-07

## 2019-04-07 RX ORDER — KETOROLAC TROMETHAMINE 30 MG/ML
15 SYRINGE (ML) INJECTION ONCE
Qty: 0 | Refills: 0 | Status: DISCONTINUED | OUTPATIENT
Start: 2019-04-07 | End: 2019-04-07

## 2019-04-07 RX ORDER — SODIUM CHLORIDE 9 MG/ML
1000 INJECTION, SOLUTION INTRAVENOUS
Qty: 0 | Refills: 0 | Status: DISCONTINUED | OUTPATIENT
Start: 2019-04-07 | End: 2019-04-09

## 2019-04-07 RX ORDER — SODIUM CHLORIDE 9 MG/ML
3 INJECTION INTRAMUSCULAR; INTRAVENOUS; SUBCUTANEOUS EVERY 8 HOURS
Qty: 0 | Refills: 0 | Status: DISCONTINUED | OUTPATIENT
Start: 2019-04-07 | End: 2019-04-11

## 2019-04-07 RX ORDER — FAMOTIDINE 10 MG/ML
20 INJECTION INTRAVENOUS ONCE
Qty: 0 | Refills: 0 | Status: COMPLETED | OUTPATIENT
Start: 2019-04-07 | End: 2019-04-07

## 2019-04-07 RX ADMIN — ONDANSETRON 4 MILLIGRAM(S): 8 TABLET, FILM COATED ORAL at 17:50

## 2019-04-07 RX ADMIN — Medication 1000 MILLIGRAM(S): at 22:47

## 2019-04-07 RX ADMIN — Medication 10 MILLIGRAM(S): at 22:45

## 2019-04-07 RX ADMIN — SODIUM CHLORIDE 4000 MILLILITER(S): 9 INJECTION, SOLUTION INTRAVENOUS at 17:50

## 2019-04-07 RX ADMIN — Medication 15 MILLIGRAM(S): at 17:51

## 2019-04-07 RX ADMIN — FAMOTIDINE 20 MILLIGRAM(S): 10 INJECTION INTRAVENOUS at 19:32

## 2019-04-07 RX ADMIN — SODIUM CHLORIDE 4000 MILLILITER(S): 9 INJECTION, SOLUTION INTRAVENOUS at 18:57

## 2019-04-07 RX ADMIN — Medication 15 MILLIGRAM(S): at 18:29

## 2019-04-07 RX ADMIN — SODIUM CHLORIDE 150 MILLILITER(S): 9 INJECTION, SOLUTION INTRAVENOUS at 23:54

## 2019-04-07 NOTE — ED CDU PROVIDER DISPOSITION NOTE - CLINICAL COURSE
38 yoF, otherwise healthy presents with 9 days of intermittent abdominal pain, diffuse, diarrhea. No vomiting. Mild nausea. Decreased PO due to post prandial pain and fear of diarrhea. Denies urinary or vaginal sx. No pelvic pain. LNMP 3.5 weeks ago, due any day. No fever/chills. No recent travel. No prior similar pain. Never seen GI before. Takes probiotics. No hx of gallstones. ?pancreatitis year ago.  In ED, patient had laboratory testing unremarkable, was medicated Pepcid 20mg, Toradol 15mg and Zofran. Patient with persistent abdominal pain despite medications was sent to CDU for serial abdominal exams, pain control, Abdomen US pending. 38 yoF, otherwise healthy presents with 9 days of intermittent abdominal pain, diffuse, diarrhea. No vomiting. Mild nausea. Decreased PO due to post prandial pain and fear of diarrhea. Denies urinary or vaginal sx. No pelvic pain. LNMP 3.5 weeks ago, due any day. No fever/chills. No recent travel. No prior similar pain. Never seen GI before. Takes probiotics. No hx of gallstones. ?pancreatitis year ago.  In ED, patient had laboratory testing unremarkable, was medicated Pepcid 20mg, Toradol 15mg and Zofran. Patient with persistent abdominal pain despite medications was sent to CDU for serial abdominal exams, pain control, Abdomen US negative for cholecystis. Patient continued to have diarrhea while in CDU therefore stool culture, GI PCR, and O&P were collected. There was no concern for cdiff as patient is immunocompetent, no recent abx, and no blood in stool. Patient continued to have abdominal pain therefore CT ordered consistent with colitis. Patient admitted for continued diarrhea and stool studies pending. ED attending Dr. Peace agreed with above for admission

## 2019-04-07 NOTE — ED CDU PROVIDER INITIAL DAY NOTE - ATTENDING CONTRIBUTION TO CARE
see my ED note -- agree w/ PAs documentation, decision making, clinical actions -- Neal Villagomez MD

## 2019-04-07 NOTE — ED ADULT NURSE NOTE - OBJECTIVE STATEMENT
37 y/o F, reported to ED from home. A&Ox3, c/o abd pain. Pt reports that the abd pain started on 3/28/19. Pt reports that at first the pain would only come in the morning and then resolve after she had a BM. Pt reports that the past few days the pain has been getting worse. Pt reports that the pain is 9/10 on pain scale, pt denies taking any pain medication prior to arrival. Pt is grimacing and jumping off the stretcher when palpating the abdomen. Pt reports generalized abd pain stating "it all hurts nothing is worse in one place or another." Pt reports that the pain lasts most of the day. Pt reports that the pain is a "pressure" and at first felt like "gas pains." Pt reports that she has been burping a lot and feeling nauseous. Pt reports that she has been having an episode of diarrhea every 1/2 hour today. Pt denies any black or bloody stools. Pt reports feeling "bloated" today. Pt reports that she has had decreased appetite and reports that she has definitely lost weight in the past week. Pt reports an occasional H/A. Pt reports one episode of vomiting today. Pt denies LOC, SOB, C/P, fever or chills. Pt denies urinary symptoms. LMP was about 4 weeks ago approximately 3/10/19. Pt denies any vaginal bleeding currently. Pt reports a hx of ectopic pregnancy and the removal of her left fallopian tube last year. Pt reports taking probiotics, vitamins, and birth control daily. Pt's parents are at bedside, will continue to monitor pt.

## 2019-04-07 NOTE — ED ADULT TRIAGE NOTE - CHIEF COMPLAINT QUOTE
pt c/o "really bad pain in my stomach since for the past 10 days, and since friday the pain has been getting more constant"

## 2019-04-07 NOTE — ED CDU PROVIDER DISPOSITION NOTE - PLAN OF CARE
Follow up with your Primary Care Physician within the next 2-3 days  Bring a copy of your test results with you to your appointment  Return to the Emergency Room if you experience new or worsening symptoms

## 2019-04-07 NOTE — ED PROVIDER NOTE - PROGRESS NOTE DETAILS
[place] PGY1- labs normal, pain improved but after PO challenge had epigastric pain, iv pepcid ordered and pt placed in CDU for furthur obs, maintenance fluids, advancing of diet, consideration of imaging if sx persist, stool cx pending, no episode of diarrhea here in ED

## 2019-04-07 NOTE — ED CDU PROVIDER DISPOSITION NOTE - ATTENDING CONTRIBUTION TO CARE
Patient seen and examined at bedside. Patient complains of lower abdominal pain and persistent diarrhea. CT shows possible colitis. Will admit patient for IV hydration and ongoing symptoms.

## 2019-04-07 NOTE — ED CDU PROVIDER INITIAL DAY NOTE - PHYSICAL EXAMINATION
PHYSICAL EXAM:  GENERAL: NAD, well-groomed, well-developed  HEAD:  Atraumatic, Normocephalic  EYES: EOMI, PERRLA, conjunctiva and sclera clear  ENMT: No tonsillar erythema, exudates, or enlargement; Moist mucous membranes  NECK: Supple, No JVD  HEART: Regular rate and rhythm; No murmurs, rubs, or gallops  RESPIRATORY: CTA B/L, No W/R/R  ABDOMEN: mild ttp epigastric, no guarding/rebound, no masses  BACK: No cva or midline tenderness, full ROM  NEURO: A&Ox3, nonfocal, moving all extremities  EXTREMITIES:  2+ Peripheral Pulses, No clubbing, cyanosis, or edema  SKIN: warm, dry, normal color, no rash

## 2019-04-07 NOTE — ED PROVIDER NOTE - ATTENDING CONTRIBUTION TO CARE
------------ATTENDING NOTE------------   pt w/ family c/o 10 days of loose watery stools, no blood, diffuse mild crampy abdominal pain, slight nausea, no vomiting, mild dehydration on exam, overall soft benign abd, awaiting labs / reassessment -->  - Neal Villagomez MD   ---------------------------------------------- ------------ATTENDING NOTE------------   pt w/ family c/o 10 days of loose watery stools, no blood, diffuse mild crampy abdominal pain, slight nausea, no vomiting, mild dehydration on exam, overall soft benign abd, awaiting labs / reassessment --> labs wnl, pending stool study, feels improved w/ IVF, nausea and mild burning epigastric pain w/ eating, overall benign repeat abdominal exams, plan for CDU for IVF, advance diet, serial abdominal exams, possible imaging if needed.  - Neal Villagomez MD   ----------------------------------------------

## 2019-04-07 NOTE — ED PROVIDER NOTE - PHYSICAL EXAMINATION
PHYSICAL EXAM:  GENERAL: NAD, well-groomed, well-developed  HEAD:  Atraumatic, Normocephalic  EYES: EOMI, PERRLA, conjunctiva and sclera clear  ENMT: No tonsillar erythema, exudates, or enlargement; Moist mucous membranes  NECK: Supple, No JVD  HEART: Regular rate and rhythm; No murmurs, rubs, or gallops  RESPIRATORY: CTA B/L, No W/R/R  ABDOMEN: mild ttp diffusely, no guarding/rebound, no masses  BACK: No cva or midline tenderness, full ROM  NEURO: A&Ox3, nonfocal, moving all extremities  EXTREMITIES:  2+ Peripheral Pulses, No clubbing, cyanosis, or edema  SKIN: warm, dry, normal color, no rash

## 2019-04-07 NOTE — ED CDU PROVIDER INITIAL DAY NOTE - OBJECTIVE STATEMENT
38 yoF, otherwise healthy presents with 9 days of intermittent abdominal pain, diffuse, diarrhea. No vomiting. Mild nausea. Decreased PO due to post prandial pain and fear of diarrhea. Denies urinary or vaginal sx. No pelvic pain. LNMP 3.5 weeks ago, due any day. No fever/chills. No recent travel. No prior similar pain. Never seen GI before. Takes probiotics. No hx of gallstones. ?pancreatitis year ago.  In ED, patient had laboratory testing unremarkable, was medicated Pepcid 20mg, Toradol 15mg and Zofran. Patient with persistent abdominal pain despite medications was sent to CDU for serial abdominal exams, pain control, Abdomen US pending.

## 2019-04-07 NOTE — ED PROVIDER NOTE - OBJECTIVE STATEMENT
38 yoF, otherwise healthy presents with 9 days of intermittent abdominal pain, diffuse, diarrhea. No vomiting. Mild nausea. Decreased PO due to post prandial pain and fear of diarrhea. Denies urinary or vaginal sx. No pelvic pain. LNMP 3.5 weeks ago, due any day. No fever/chills. No recent travel. No prior similar pain. Never seen GI before. Takes probiotics. No hx of gallstones. ?pancreatitis year ago.

## 2019-04-07 NOTE — ED PROVIDER NOTE - CARE PLAN
Principal Discharge DX:	Diarrhea  Secondary Diagnosis:	Dehydration, mild Principal Discharge DX:	Diarrhea  Secondary Diagnosis:	Dehydration, mild  Secondary Diagnosis:	Dyspepsia

## 2019-04-07 NOTE — ED ADULT NURSE REASSESSMENT NOTE - NS ED NURSE REASSESS COMMENT FT1
Report received from Aileen DON, VS repeated. Patient c/o nausea and pain, MD made aware
Pt received from ARIAN Pacheco. Pt oriented to CDU & plan of care was discussed. Pt A&O x 4. Pt in CDU for serial abdominal exams, pain control, and abdomen US pending. Pt denies any nausea, vomiting, or diarrhea as of now. Pt c/o 8/10 pain to her suprapubic/abdomen, PA Hollis aware, medicated as per MAR. Bowel sounds heard in all four quads. Pt resting in bed, Safety & comfort measures maintained. Call bell in reach. Will continue to monitor.

## 2019-04-07 NOTE — ED CDU PROVIDER INITIAL DAY NOTE - PROGRESS NOTE DETAILS
CDU PROGRESS NOTE PA KEVIN: Pt at Ultrasound CDU PROGRESS NOTE PA KEVIN: Pt NAD, VSS. Patient c/o 8/10 generalized abdominal pain, crampy, waxing and waning. Abdomen (+) bowel sounds, + mild tenderness to deep palpation throughout. non distended, no guarding or rebound. Will order Tylenol 1gm PO and continue to monitor.

## 2019-04-08 DIAGNOSIS — R19.7 DIARRHEA, UNSPECIFIED: ICD-10-CM

## 2019-04-08 LAB
ANION GAP SERPL CALC-SCNC: 8 MMOL/L — SIGNIFICANT CHANGE UP (ref 5–17)
APPEARANCE UR: CLEAR — SIGNIFICANT CHANGE UP
BACTERIA # UR AUTO: NEGATIVE — SIGNIFICANT CHANGE UP
BASOPHILS # BLD AUTO: 0 K/UL — SIGNIFICANT CHANGE UP (ref 0–0.2)
BASOPHILS NFR BLD AUTO: 0.4 % — SIGNIFICANT CHANGE UP (ref 0–2)
BILIRUB UR-MCNC: NEGATIVE — SIGNIFICANT CHANGE UP
BUN SERPL-MCNC: 8 MG/DL — SIGNIFICANT CHANGE UP (ref 7–23)
CALCIUM SERPL-MCNC: 9 MG/DL — SIGNIFICANT CHANGE UP (ref 8.4–10.5)
CHLORIDE SERPL-SCNC: 109 MMOL/L — HIGH (ref 96–108)
CO2 SERPL-SCNC: 24 MMOL/L — SIGNIFICANT CHANGE UP (ref 22–31)
COLOR SPEC: YELLOW — SIGNIFICANT CHANGE UP
CREAT SERPL-MCNC: 0.8 MG/DL — SIGNIFICANT CHANGE UP (ref 0.5–1.3)
CULTURE RESULTS: SIGNIFICANT CHANGE UP
DIFF PNL FLD: NEGATIVE — SIGNIFICANT CHANGE UP
EOSINOPHIL # BLD AUTO: 0.1 K/UL — SIGNIFICANT CHANGE UP (ref 0–0.5)
EOSINOPHIL NFR BLD AUTO: 1.5 % — SIGNIFICANT CHANGE UP (ref 0–6)
EPI CELLS # UR: 2 /HPF — SIGNIFICANT CHANGE UP
GAS PNL BLDV: SIGNIFICANT CHANGE UP
GLUCOSE SERPL-MCNC: 95 MG/DL — SIGNIFICANT CHANGE UP (ref 70–99)
GLUCOSE UR QL: NEGATIVE — SIGNIFICANT CHANGE UP
HCT VFR BLD CALC: 38.1 % — SIGNIFICANT CHANGE UP (ref 34.5–45)
HGB BLD-MCNC: 12.9 G/DL — SIGNIFICANT CHANGE UP (ref 11.5–15.5)
HYALINE CASTS # UR AUTO: 1 /LPF — SIGNIFICANT CHANGE UP (ref 0–7)
KETONES UR-MCNC: NEGATIVE — SIGNIFICANT CHANGE UP
LEUKOCYTE ESTERASE UR-ACNC: NEGATIVE — SIGNIFICANT CHANGE UP
LYMPHOCYTES # BLD AUTO: 1.4 K/UL — SIGNIFICANT CHANGE UP (ref 1–3.3)
LYMPHOCYTES # BLD AUTO: 21.6 % — SIGNIFICANT CHANGE UP (ref 13–44)
MAGNESIUM SERPL-MCNC: 2 MG/DL — SIGNIFICANT CHANGE UP (ref 1.6–2.6)
MCHC RBC-ENTMCNC: 30.4 PG — SIGNIFICANT CHANGE UP (ref 27–34)
MCHC RBC-ENTMCNC: 33.9 GM/DL — SIGNIFICANT CHANGE UP (ref 32–36)
MCV RBC AUTO: 89.7 FL — SIGNIFICANT CHANGE UP (ref 80–100)
MONOCYTES # BLD AUTO: 0.4 K/UL — SIGNIFICANT CHANGE UP (ref 0–0.9)
MONOCYTES NFR BLD AUTO: 6.4 % — SIGNIFICANT CHANGE UP (ref 2–14)
NEUTROPHILS # BLD AUTO: 4.6 K/UL — SIGNIFICANT CHANGE UP (ref 1.8–7.4)
NEUTROPHILS NFR BLD AUTO: 70 % — SIGNIFICANT CHANGE UP (ref 43–77)
NITRITE UR-MCNC: NEGATIVE — SIGNIFICANT CHANGE UP
PH UR: 6 — SIGNIFICANT CHANGE UP (ref 5–8)
PLATELET # BLD AUTO: 235 K/UL — SIGNIFICANT CHANGE UP (ref 150–400)
POTASSIUM SERPL-MCNC: 4.2 MMOL/L — SIGNIFICANT CHANGE UP (ref 3.5–5.3)
POTASSIUM SERPL-SCNC: 4.2 MMOL/L — SIGNIFICANT CHANGE UP (ref 3.5–5.3)
PROT UR-MCNC: ABNORMAL
RBC # BLD: 4.24 M/UL — SIGNIFICANT CHANGE UP (ref 3.8–5.2)
RBC # FLD: 12 % — SIGNIFICANT CHANGE UP (ref 10.3–14.5)
RBC CASTS # UR COMP ASSIST: 2 /HPF — SIGNIFICANT CHANGE UP (ref 0–4)
SODIUM SERPL-SCNC: 141 MMOL/L — SIGNIFICANT CHANGE UP (ref 135–145)
SP GR SPEC: 1.03 — HIGH (ref 1.01–1.02)
SPECIMEN SOURCE: SIGNIFICANT CHANGE UP
UROBILINOGEN FLD QL: NEGATIVE — SIGNIFICANT CHANGE UP
WBC # BLD: 6.6 K/UL — SIGNIFICANT CHANGE UP (ref 3.8–10.5)
WBC # FLD AUTO: 6.6 K/UL — SIGNIFICANT CHANGE UP (ref 3.8–10.5)
WBC UR QL: 3 /HPF — SIGNIFICANT CHANGE UP (ref 0–5)

## 2019-04-08 PROCEDURE — 99217: CPT

## 2019-04-08 PROCEDURE — 74177 CT ABD & PELVIS W/CONTRAST: CPT | Mod: 26

## 2019-04-08 RX ORDER — ONDANSETRON 8 MG/1
4 TABLET, FILM COATED ORAL ONCE
Qty: 0 | Refills: 0 | Status: COMPLETED | OUTPATIENT
Start: 2019-04-08 | End: 2019-04-08

## 2019-04-08 RX ORDER — DIPHENHYDRAMINE HCL 50 MG
50 CAPSULE ORAL ONCE
Qty: 0 | Refills: 0 | Status: COMPLETED | OUTPATIENT
Start: 2019-04-08 | End: 2019-04-08

## 2019-04-08 RX ORDER — METRONIDAZOLE 500 MG
500 TABLET ORAL EVERY 8 HOURS
Qty: 0 | Refills: 0 | Status: DISCONTINUED | OUTPATIENT
Start: 2019-04-08 | End: 2019-04-09

## 2019-04-08 RX ORDER — ACETAMINOPHEN 500 MG
1000 TABLET ORAL ONCE
Qty: 0 | Refills: 0 | Status: COMPLETED | OUTPATIENT
Start: 2019-04-08 | End: 2019-04-08

## 2019-04-08 RX ORDER — POTASSIUM CHLORIDE 20 MEQ
40 PACKET (EA) ORAL ONCE
Qty: 0 | Refills: 0 | Status: COMPLETED | OUTPATIENT
Start: 2019-04-08 | End: 2019-04-08

## 2019-04-08 RX ORDER — METOCLOPRAMIDE HCL 10 MG
10 TABLET ORAL ONCE
Qty: 0 | Refills: 0 | Status: COMPLETED | OUTPATIENT
Start: 2019-04-08 | End: 2019-04-08

## 2019-04-08 RX ORDER — CIPROFLOXACIN LACTATE 400MG/40ML
400 VIAL (ML) INTRAVENOUS EVERY 12 HOURS
Qty: 0 | Refills: 0 | Status: DISCONTINUED | OUTPATIENT
Start: 2019-04-08 | End: 2019-04-09

## 2019-04-08 RX ORDER — FAMOTIDINE 10 MG/ML
20 INJECTION INTRAVENOUS EVERY 12 HOURS
Qty: 0 | Refills: 0 | Status: DISCONTINUED | OUTPATIENT
Start: 2019-04-08 | End: 2019-04-10

## 2019-04-08 RX ORDER — ONDANSETRON 8 MG/1
4 TABLET, FILM COATED ORAL EVERY 6 HOURS
Qty: 0 | Refills: 0 | Status: DISCONTINUED | OUTPATIENT
Start: 2019-04-08 | End: 2019-04-10

## 2019-04-08 RX ADMIN — Medication 400 MILLIGRAM(S): at 20:03

## 2019-04-08 RX ADMIN — Medication 100 MILLIGRAM(S): at 21:46

## 2019-04-08 RX ADMIN — Medication 1000 MILLIGRAM(S): at 20:33

## 2019-04-08 RX ADMIN — ONDANSETRON 4 MILLIGRAM(S): 8 TABLET, FILM COATED ORAL at 05:12

## 2019-04-08 RX ADMIN — Medication 50 MILLIGRAM(S): at 08:47

## 2019-04-08 RX ADMIN — FAMOTIDINE 20 MILLIGRAM(S): 10 INJECTION INTRAVENOUS at 08:46

## 2019-04-08 RX ADMIN — SODIUM CHLORIDE 3 MILLILITER(S): 9 INJECTION INTRAMUSCULAR; INTRAVENOUS; SUBCUTANEOUS at 21:45

## 2019-04-08 RX ADMIN — SODIUM CHLORIDE 3 MILLILITER(S): 9 INJECTION INTRAMUSCULAR; INTRAVENOUS; SUBCUTANEOUS at 05:32

## 2019-04-08 RX ADMIN — SODIUM CHLORIDE 150 MILLILITER(S): 9 INJECTION, SOLUTION INTRAVENOUS at 14:49

## 2019-04-08 RX ADMIN — Medication 200 MILLIGRAM(S): at 13:47

## 2019-04-08 RX ADMIN — Medication 40 MILLIEQUIVALENT(S): at 05:12

## 2019-04-08 RX ADMIN — Medication 100 MILLIGRAM(S): at 14:49

## 2019-04-08 RX ADMIN — SODIUM CHLORIDE 3 MILLILITER(S): 9 INJECTION INTRAMUSCULAR; INTRAVENOUS; SUBCUTANEOUS at 14:49

## 2019-04-08 RX ADMIN — Medication 10 MILLIGRAM(S): at 08:46

## 2019-04-08 NOTE — H&P ADULT - ASSESSMENT
38 F current smoker, no personal or familail hx of IBD presenting with  9 days of intermittent abdominal pain, 10/10 at times diffuse associatd with chills but no documented fever .. and with multiple episodes of watery diarrhea and vomitting.  no relieving or exacerbating factors   Denies urinary or vaginal sx. No pelvic pain. LNMP 3.5 weeks ago,    No recent travel. No prior similar pain. Never seen GI before. Takes probiotics.     In ED, patient had laboratory testing unremarkable, was medicated Pepcid 20mg, Toradol 15mg and Zofran. Patient with persistent abdominal pain despite medications was sent to CDU for serial abdominal exams, pain control, Abdomen US negative for cholecystis. Patient continued to have diarrhea while in CDU therefore stool culture, GI PCR, and O&P were collected. There was no concern for cdiff as patient is immunocompetent, no recent abx, and no blood in stool. Patient continued to have abdominal pain therefore CT ordered consistent with colitis. Patient admitted for continued diarrhea and stool studies  pending..     pt still with abdo pain  however N/V seem to subside   was unable to take anything by mouth in cdu due to worsning abd pain     1- abd pain : CT showing colitis   no fever  and no elevated WBC however sx for 10 days  will cont empiric abx   pain meds   clears and call GI for consult   consider infectious consult     2- smoking: counceling for smoking cessation     3- N/V :  zofran prn   clear liquiid diet

## 2019-04-08 NOTE — H&P ADULT - NSICDXPASTSURGICALHX_GEN_ALL_CORE_FT
PAST SURGICAL HISTORY:  H/O unilateral salpingectomy 3/10/18    H/O:  x2    No Past Surgical History

## 2019-04-08 NOTE — H&P ADULT - NSICDXPASTMEDICALHX_GEN_ALL_CORE_FT
PAST MEDICAL HISTORY:  Ectopic pregnancy     Hypertension in pregnancy     Missed      No Past Medical History     Sudden infant death 16 at 7 weeks (boy)

## 2019-04-08 NOTE — ED CDU PROVIDER SUBSEQUENT DAY NOTE - ATTENDING CONTRIBUTION TO CARE
Patient seen and examined at bedside. Patient complains of lower abdominal pain and persistent diarrhea. Will order CT to further evaluate pain. IV hydration and monitor.

## 2019-04-08 NOTE — ED CDU PROVIDER SUBSEQUENT DAY NOTE - PROGRESS NOTE DETAILS
CDU PROGRESS NOTE PA KEVIN: Pt resting in stretcher in NAD, VSS, Abdomen BS x4 with soft, + mild ttp generalized abdomen. Patient states she is feeling better and pain subsided. Repeat CBC unremarkable, no leukocystosis. Will continue to monitor. VALERIANO Peoples: Patient seen at bedside in NAD.  VSS.  Patient resting comfortably however does report diffuse lower abdominal pain with radiation to epigastrium. Patient stated the pain is crampy rated 7/10 and just had one episode watery diarrhea that was collected for stool culture. Patient stated stool was foul smelling. Stool yellow green and watery. Patient also reported nausea as well. Had zofran approx 5:30AM. Will give patient reglan and benadryl for nausea and reassess VALERIANO Peoples: endorsed to Dr. Peace patient continues to have diarrhea (2nd episode this morning) while in CDU and has abdominal tenderness on exam. ED attending Dr. Peace agreed with CT AP oral and IV contrast. Also will add on GI PCR and O&P. ED attending recommended BMP and Mg. 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 Patient may warrant admission for continued diarrhea VALERIANO Peoples: patient continues to have liquidy stool. Patient CT consistent with colitis. Patient preferred to be admitted for IVF and IV abx. Stool studies pending. PMD Dr. Luis Andrade division [facility] therefore paged [facility]. Started patient on IV cipro/flagyl. ED attending Dr. Peace agreed with admission

## 2019-04-08 NOTE — H&P ADULT - HISTORY OF PRESENT ILLNESS
38 F current smoker, no personal or familail hx of IBD presenting with  9 days of intermittent abdominal pain, 10/10 at times diffuse associatd with chills but no documented fever .. and with multiple episodes of watery diarrhea and vomitting.  no relieving or exacerbating factors   Denies urinary or vaginal sx. No pelvic pain. LNMP 3.5 weeks ago,    No recent travel. No prior similar pain. Never seen GI before. Takes probiotics.     In ED, patient had laboratory testing unremarkable, was medicated Pepcid 20mg, Toradol 15mg and Zofran. Patient with persistent abdominal pain despite medications was sent to CDU for serial abdominal exams, pain control, Abdomen US negative for cholecystis. Patient continued to have diarrhea while in CDU therefore stool culture, GI PCR, and O&P were collected. There was no concern for cdiff as patient is immunocompetent, no recent abx, and no blood in stool. Patient continued to have abdominal pain therefore CT ordered consistent with colitis. Patient admitted for continued diarrhea and stool studies  pending..     pt still with abdo pain  however N/V seem to subside   was unable to take anything by mouth in cdu due to worsning

## 2019-04-09 DIAGNOSIS — A08.11 ACUTE GASTROENTEROPATHY DUE TO NORWALK AGENT: ICD-10-CM

## 2019-04-09 LAB
ANION GAP SERPL CALC-SCNC: 11 MMOL/L — SIGNIFICANT CHANGE UP (ref 5–17)
BUN SERPL-MCNC: 6 MG/DL — LOW (ref 7–23)
CALCIUM SERPL-MCNC: 8.5 MG/DL — SIGNIFICANT CHANGE UP (ref 8.4–10.5)
CHLORIDE SERPL-SCNC: 108 MMOL/L — SIGNIFICANT CHANGE UP (ref 96–108)
CO2 SERPL-SCNC: 21 MMOL/L — LOW (ref 22–31)
CREAT SERPL-MCNC: 0.76 MG/DL — SIGNIFICANT CHANGE UP (ref 0.5–1.3)
GLUCOSE SERPL-MCNC: 129 MG/DL — HIGH (ref 70–99)
HCT VFR BLD CALC: 36.5 % — SIGNIFICANT CHANGE UP (ref 34.5–45)
HGB BLD-MCNC: 12 G/DL — SIGNIFICANT CHANGE UP (ref 11.5–15.5)
MCHC RBC-ENTMCNC: 29.2 PG — SIGNIFICANT CHANGE UP (ref 27–34)
MCHC RBC-ENTMCNC: 32.9 GM/DL — SIGNIFICANT CHANGE UP (ref 32–36)
MCV RBC AUTO: 88.8 FL — SIGNIFICANT CHANGE UP (ref 80–100)
PLATELET # BLD AUTO: 218 K/UL — SIGNIFICANT CHANGE UP (ref 150–400)
POTASSIUM SERPL-MCNC: 3.6 MMOL/L — SIGNIFICANT CHANGE UP (ref 3.5–5.3)
POTASSIUM SERPL-SCNC: 3.6 MMOL/L — SIGNIFICANT CHANGE UP (ref 3.5–5.3)
PROCALCITONIN SERPL-MCNC: 0.08 NG/ML — SIGNIFICANT CHANGE UP (ref 0.02–0.1)
RBC # BLD: 4.11 M/UL — SIGNIFICANT CHANGE UP (ref 3.8–5.2)
RBC # FLD: 12.8 % — SIGNIFICANT CHANGE UP (ref 10.3–14.5)
SODIUM SERPL-SCNC: 140 MMOL/L — SIGNIFICANT CHANGE UP (ref 135–145)
WBC # BLD: 4.51 K/UL — SIGNIFICANT CHANGE UP (ref 3.8–10.5)
WBC # FLD AUTO: 4.51 K/UL — SIGNIFICANT CHANGE UP (ref 3.8–10.5)

## 2019-04-09 RX ORDER — SODIUM CHLORIDE 9 MG/ML
1000 INJECTION, SOLUTION INTRAVENOUS
Qty: 0 | Refills: 0 | Status: DISCONTINUED | OUTPATIENT
Start: 2019-04-09 | End: 2019-04-10

## 2019-04-09 RX ADMIN — SODIUM CHLORIDE 150 MILLILITER(S): 9 INJECTION, SOLUTION INTRAVENOUS at 04:59

## 2019-04-09 RX ADMIN — SODIUM CHLORIDE 3 MILLILITER(S): 9 INJECTION INTRAMUSCULAR; INTRAVENOUS; SUBCUTANEOUS at 05:12

## 2019-04-09 RX ADMIN — Medication 100 MILLIGRAM(S): at 05:00

## 2019-04-09 RX ADMIN — Medication 200 MILLIGRAM(S): at 05:00

## 2019-04-09 RX ADMIN — SODIUM CHLORIDE 3 MILLILITER(S): 9 INJECTION INTRAMUSCULAR; INTRAVENOUS; SUBCUTANEOUS at 15:04

## 2019-04-09 RX ADMIN — Medication 100 MILLIGRAM(S): at 15:03

## 2019-04-09 RX ADMIN — SODIUM CHLORIDE 3 MILLILITER(S): 9 INJECTION INTRAMUSCULAR; INTRAVENOUS; SUBCUTANEOUS at 21:32

## 2019-04-09 RX ADMIN — FAMOTIDINE 20 MILLIGRAM(S): 10 INJECTION INTRAVENOUS at 05:11

## 2019-04-09 RX ADMIN — FAMOTIDINE 20 MILLIGRAM(S): 10 INJECTION INTRAVENOUS at 18:52

## 2019-04-09 RX ADMIN — SODIUM CHLORIDE 75 MILLILITER(S): 9 INJECTION, SOLUTION INTRAVENOUS at 11:55

## 2019-04-09 NOTE — PROVIDER CONTACT NOTE (CRITICAL VALUE NOTIFICATION) - ACTION/TREATMENT ORDERED:
Provider notified, patient placed on contact precautions, further intervention to be determined. Will continue to monitor.

## 2019-04-09 NOTE — CONSULT NOTE ADULT - SUBJECTIVE AND OBJECTIVE BOX
[facility], Division of Infectious Diseases  ZAHRA Hall A. Lee  [phone]    TYLER ELDRIDGE  38y, Female  [id number]    HPI:  38 F current smoker,  presenting with  10 days of intermittent abdominal pain, 10/10 at times diffuse associated with chills but no documented fever .. and with multiple episodes of watery diarrhea.  States she had watery stool followed by soft stool, then became watery again.    no relieving or exacerbating factors and the pain in the abd was what lead her to come to E.  Denies urinary or vaginal sx. No pelvic pain. LNMP 3.5 weeks ago,   No recent travel. No prior similar pain.   Her inlaws went to [place] recently and she went to their house for dinner.  They are not sick, but the next day, her  and both children had a gastroenteritis which resolved,  but hers persisted.    in ED, Patient continued to have abdominal pain therefore CT ordered consistent with colitis.   Patient admitted GI pcr ++ norovirus     states she has some BM still but improved and abd pain is better.  no recent antibiotics.      PMH/PSH--  Ectopic pregnancy  Sudden infant death  Missed   Hypertension in pregnancy  No Past Medical History  H/O unilateral salpingectomy  H/O:         Allergies--NKDA      Medications--  Antibiotics: ciprofloxacin   IVPB 400 milliGRAM(s) IV Intermittent every 12 hours  metroNIDAZOLE  IVPB 500 milliGRAM(s) IV Intermittent every 8 hours    Immunologic:   Other: famotidine Injectable  lactated ringers.  ondansetron Injectable PRN  sodium chloride 0.9% lock flush      Social History--  EtOH: denies ***  Tobacco: +++  Drug Use: denies ***    Family/Marital History--  , lives with     Remainder not relevant to clinical concern.    Travel/Environmental/Occupational History:  teacher    Review of Systems:  A >=10-point review of systems was obtained.     Pertinent positives and negatives--  Constitutional: No fevers. No Chills. No Rigors.   Eyes: no blurry vision  ENMT: no sore throat  Cardiovascular: No chest pain. No palpitations.  Respiratory: No shortness of breath. No cough.  Gastrointestinal: No nausea or vomiting. + diarrhea   Genitourinary: no dysuria, no urgency  Musculoskeletal: no arthritis  Skin: no rash  Neurologic: no headache  Psychiatric: no depression    Review of systems otherwise negative except as previously noted.    Physical Exam--  Vital Signs: T(F): 98.8 (19 @ 13:35), Max: 98.8 (19 @ 13:35)  HR: 65 (19 @ 13:35)  BP: 128/88 (19 @ 13:35)  RR: 18 (19 @ 13:35)  SpO2: 95% (19 @ 13:35)  Wt(kg): --  General: Nontoxic-appearing Female in no acute distress.  HEENT: AT/NC.Anicteric. Conjunctiva pink and moist. Oropharynx clear. Dentition fair.  Neck: Not rigid. No sense of mass.  Nodes: None palpable.  Lungs: Clear bilaterally without rales, wheezing or rhonchi  Heart: Regular rate and rhythm. No Murmur.   Abdomen: Bowel sounds +++. Soft. Nondistended. Nontender.  Back: No spinal tenderness. No costovertebral angle tenderness.   Extremities: No cyanosis or clubbing. No edema.   Skin: Warm. Dry. Good turgor. No rash. No vasculitic stigmata.  Psychiatric: Appropriate affect and mood for situation.         Laboratory & Imaging Data--  CBC                        12.0   4.51  )-----------( 218      ( 2019 09:33 )             36.5       Chemistries      140  |  108  |  6<L>  ----------------------------<  129<H>  3.6   |  21<L>  |  0.76    Ca    8.5      2019 07:18  Mg     2.0         TPro  7.6  /  Alb  4.7  /  TBili  0.5  /  DBili  x   /  AST  32  /  ALT  20  /  AlkPhos  53        Culture Data    GI PCR Panel, Stool (collected 2019 13:59)  Source: .Stool  Final Report (2019 19:14):    Norovirus GI/GII    DETECTED by PCR    *******Please Note:*******    GI panel PCR evaluates for:    Campylobacter, Plesiomonas shigelloides, Salmonella,    Vibrio, Yersinia enterocolitica, Enteroaggregative    Escherichia coli (EAEC), Enteropathogenic E.coli (EPEC),    Enterotoxigenic E. coli (ETEC) lt/st, Shiga-like    toxin-producing E. coli (STEC) stx1/stx2,    Shigella/ Enteroinvasive E. coli (EIEC), Cryptosporidium,    Cyclospora cayetanensis, Entamoeba histolytica,    Giardia lamblia, Adenovirus F 40/41, Astrovirus,    Norovirus GI/GII, Rotavirus A, Sapovirus        < from: CT Abdomen and Pelvis w/ Oral Cont and w/ IV Cont (19 @ 12:08) >  EXAM:  CT ABDOMEN AND PELVIS OC IC                            PROCEDURE DATE:  2019            INTERPRETATION:  CLINICAL INFORMATION: Abdominal pain and diarrhea for 10   days. Lower abdominal tenderness to palpation.         COMPARISON: None.    PROCEDURE:   CT of the Abdomen and Pelvis was performed with intravenous contrast.   Intravenous contrast: 90 ml Omnipaque 350. 10 ml discarded.  Oral contrast: positive contrast was administered.  Sagittal and coronal reformats were performed.    FINDINGS:    LOWER CHEST: Within normal limits.    LIVER: Within normal limits.  BILE DUCTS: Normal caliber.  GALLBLADDER: Within normal limits.  SPLEEN: Within normal limits.  PANCREAS: Within normal limits.  ADRENALS: Within normal limits.  KIDNEYS/URETERS: Within normal limits.    BLADDER: Within normal limits.  REPRODUCTIVE ORGANS: Uterus and adnexa are within normal limits.    BOWEL: No bowel obstruction. Appendix is not visualized. Mild apparent   wall thickening is present in the descending and sigmoid colon, which may   reflect accentuation secondary to underdistention or findings of colitis.  PERITONEUM: No ascites.  VESSELS:  Atherosclerotic calcifications.  RETROPERITONEUM: No lymphadenopathy.    ABDOMINAL WALL: Within normal limits.  BONES: Within normal limits.    IMPRESSION:     Bowel wall thickening of the descending and sigmoid colon which may   reflect accentuation due to underdistention or findings of colitis.      < end of copied text >

## 2019-04-09 NOTE — CONSULT NOTE ADULT - PROBLEM SELECTOR RECOMMENDATION 9
supportive care-- hydration  d/c antibx  increase diet as tolerated  would have pt f/u with GI in 6-8 weeks as outpt   for workup of colon wall thickening which usually does not happen with post infectious IBS.    counseled on frequent hand washing.

## 2019-04-09 NOTE — PROGRESS NOTE ADULT - SUBJECTIVE AND OBJECTIVE BOX
Patient is a 38y old  Female who presents with a chief complaint of n/v diarreah (09 Apr 2019 13:48)                                                               INTERVAL HPI/OVERNIGHT EVENTS:    REVIEW OF SYSTEMS:     CONSTITUTIONAL: No weakness, fevers or chills  EYES/ENT: No visual changes , no ear ache   NECK: No pain or stiffness  RESPIRATORY: No cough, wheezing,  No shortness of breath  CARDIOVASCULAR: No chest pain or palpitations  GASTROINTESTINAL: No abdominal pain  . No nausea, vomiting, or hematemesis; No diarrhea or constipation. No melena or hematochezia.  GENITOURINARY: No dysuria, frequency or hematuria  NEUROLOGICAL: No numbness or weakness  SKIN: No itching, burning, rashes, or lesions                                                                                                                                                                                                                                                                                 Medications:  MEDICATIONS  (STANDING):  famotidine Injectable 20 milliGRAM(s) IV Push every 12 hours  lactated ringers. 1000 milliLiter(s) (75 mL/Hr) IV Continuous <Continuous>  sodium chloride 0.9% lock flush 3 milliLiter(s) IV Push every 8 hours    MEDICATIONS  (PRN):  ondansetron Injectable 4 milliGRAM(s) IV Push every 6 hours PRN Nausea and/or Vomiting       Allergies    No Known Allergies    Intolerances      Vital Signs Last 24 Hrs  T(C): 36.9 (09 Apr 2019 22:33), Max: 37.1 (09 Apr 2019 13:35)  T(F): 98.5 (09 Apr 2019 22:33), Max: 98.8 (09 Apr 2019 13:35)  HR: 66 (09 Apr 2019 22:33) (62 - 70)  BP: 110/69 (09 Apr 2019 22:33) (106/67 - 137/89)  BP(mean): --  RR: 18 (09 Apr 2019 22:33) (18 - 20)  SpO2: 97% (09 Apr 2019 22:33) (95% - 97%)  CAPILLARY BLOOD GLUCOSE          04-08 @ 07:01  -  04-09 @ 07:00  --------------------------------------------------------  IN: 750 mL / OUT: 0 mL / NET: 750 mL    04-09 @ 07:01  -  04-09 @ 22:35  --------------------------------------------------------  IN: 1700 mL / OUT: 0 mL / NET: 1700 mL      Physical Exam:    Daily     Daily   General:  Well appearing, NAD, not cachetic  HEENT:  Nonicteric, PERRLA  CV:  RRR, S1S2   Lungs:  CTA B/L, no wheezes, rales, rhonchi  Abdomen:  Soft, non-tender, no distended, positive BS  Extremities:  2+ pulses, no c/c, no edema  Skin:  Warm and dry, no rashes  :  No storey  Neuro:  AAOx3, non-focal, grossly intact                                                                                                                                                                                                                                                                                                LABS:                               12.0   4.51  )-----------( 218      ( 09 Apr 2019 09:33 )             36.5                      04-09    140  |  108  |  6<L>  ----------------------------<  129<H>  3.6   |  21<L>  |  0.76    Ca    8.5      09 Apr 2019 07:18  Mg     2.0     04-08                         RADIOLOGY & ADDITIONAL TESTS         I personally reviewed: [  ]EKG   [  ]CXR    [  ] CT      A/P:         Discussed with :     Jannette consultants' Notes   Time spent : Patient is a 38y old  Female who presents with a chief complaint of n/v diarreah (09 Apr 2019 13:48)                                                               INTERVAL HPI/OVERNIGHT EVENTS:    REVIEW OF SYSTEMS:     CONSTITUTIONAL: No weakness, fevers or chills  RESPIRATORY: No cough, wheezing,  No shortness of breath  CARDIOVASCULAR: No chest pain or palpitations  GASTROINTESTINAL:immproving  abdominal pain  . No nausea, vomiting, however still with 6 episodes of diarreah thus far today   no melena or hematochezia   GENITOURINARY: No dysuria, frequency or hematuria  NEUROLOGICAL: No numbness or weakness                                                                                                                                                                                                                                                                                 Medications:  MEDICATIONS  (STANDING):  famotidine Injectable 20 milliGRAM(s) IV Push every 12 hours  lactated ringers. 1000 milliLiter(s) (75 mL/Hr) IV Continuous <Continuous>  sodium chloride 0.9% lock flush 3 milliLiter(s) IV Push every 8 hours    MEDICATIONS  (PRN):  ondansetron Injectable 4 milliGRAM(s) IV Push every 6 hours PRN Nausea and/or Vomiting       Allergies    No Known Allergies    Intolerances      Vital Signs Last 24 Hrs  T(C): 36.9 (09 Apr 2019 22:33), Max: 37.1 (09 Apr 2019 13:35)  T(F): 98.5 (09 Apr 2019 22:33), Max: 98.8 (09 Apr 2019 13:35)  HR: 66 (09 Apr 2019 22:33) (62 - 70)  BP: 110/69 (09 Apr 2019 22:33) (106/67 - 137/89)  BP(mean): --  RR: 18 (09 Apr 2019 22:33) (18 - 20)  SpO2: 97% (09 Apr 2019 22:33) (95% - 97%)  CAPILLARY BLOOD GLUCOSE          04-08 @ 07:01  -  04-09 @ 07:00  --------------------------------------------------------  IN: 750 mL / OUT: 0 mL / NET: 750 mL    04-09 @ 07:01  -  04-09 @ 22:35  --------------------------------------------------------  IN: 1700 mL / OUT: 0 mL / NET: 1700 mL      Physical Exam:    General:  Well appearing, NAD, not cachetic  HEENT:  Nonicteric, PERRLA  CV:  RRR, S1S2   Lungs:  CTA B/L, no wheezes, rales, rhonchi  Abdomen:  Soft, non-tender, no distended, positive BS  Extremities:  2+ pulses, no c/c, no edema  Skin:  Warm and dry, no rashes  :  No storey  Neuro:  AAOx3, non-focal, grossly intact                                                                                                                                                                                                                                                                                                LABS:                               12.0   4.51  )-----------( 218      ( 09 Apr 2019 09:33 )             36.5                      04-09    140  |  108  |  6<L>  ----------------------------<  129<H>  3.6   |  21<L>  |  0.76    Ca    8.5      09 Apr 2019 07:18  Mg     2.0     04-08

## 2019-04-09 NOTE — CONSULT NOTE ADULT - ASSESSMENT
38f with no pmh admitted with   abd pain and diarrhea  nontoxic, afeb, normal wbc  found to have norovirus-- self limiting process   but there is concern for some bowel wall thickening on CT scan

## 2019-04-09 NOTE — PROGRESS NOTE ADULT - ASSESSMENT
38 F current smoker, no personal or familail hx of IBD presenting with  9 days of intermittent abdominal pain, 10/10 at times diffuse associatd with chills but no documented fever .. and with multiple episodes of watery diarrhea and vomitting.  no relieving or exacerbating factors   Denies urinary or vaginal sx. No pelvic pain. LNMP 3.5 weeks ago,    No recent travel. No prior similar pain. Never seen GI before. Takes probiotics.     In ED, patient had laboratory testing unremarkable, was medicated Pepcid 20mg, Toradol 15mg and Zofran. Patient with persistent abdominal pain despite medications was sent to CDU for serial abdominal exams, pain control, Abdomen US negative for cholecystis. Patient continued to have diarrhea while in CDU therefore stool culture, GI PCR, and O&P were collected. There was no concern for cdiff as patient is immunocompetent, no recent abx, and no blood in stool. Patient continued to have abdominal pain therefore CT ordered consistent with colitis. Patient admitted for continued diarrhea and stool studies  pending..     pt still with abdo pain  however N/V seem to subside   was unable to take anything by mouth in cdu due to worsning abd pain     1- abd pain : CT showing colitis   no fever  and no elevated WBC however sx for 10 days  will cont empiric abx   pain meds   clears and call GI for consult   consider infectious consult     2- smoking: counceling for smoking cessation     3- N/V :  zofran prn   clear liquiid diet 38 F current smoker, no personal or familail hx of IBD presenting with  9 days of intermittent abdominal pain, 10/10 at times diffuse associatd with chills but no documented fever .. and with multiple episodes of watery diarrhea and vomitting.  no relieving or exacerbating factors   Denies urinary or vaginal sx. No pelvic pain. LNMP 3.5 weeks ago,    No recent travel. No prior similar pain. Never seen GI before. Takes probiotics.     In ED, patient had laboratory testing unremarkable, was medicated Pepcid 20mg, Toradol 15mg and Zofran. Patient with persistent abdominal pain despite medications was sent to CDU for serial abdominal exams, pain control, Abdomen US negative for cholecystis. Patient continued to have diarrhea while in CDU therefore stool culture, GI PCR, and O&P were collected. There was no concern for cdiff as patient is immunocompetent, no recent abx, and no blood in stool. Patient continued to have abdominal pain therefore CT ordered consistent with colitis. Patient admitted for continued diarrhea and stool studies  pending..     pt still with abdo pain  however N/V seem to subside   was unable to take anything by mouth in cdu due to worsning abd pain     1- abd pain : CT showing colitis   no fever  and no elevated WBC however sx for 10 days  d/w Dr. Velez : can dc abx however pt will likely need a colonoscopy in 4-6 weeks to r/o uunderlying IBD   pain meds   advvance diet slowly   appreciate ID input   will call GI consult   add imodium if cont with significant diarreah     2- smoking: counceling for smoking cessation     3- N/V :  zofran prn   resolved

## 2019-04-10 ENCOUNTER — TRANSCRIPTION ENCOUNTER (OUTPATIENT)
Age: 39
End: 2019-04-10

## 2019-04-10 LAB
ALBUMIN SERPL ELPH-MCNC: 3.6 G/DL — SIGNIFICANT CHANGE UP (ref 3.3–5)
ALP SERPL-CCNC: 39 U/L — LOW (ref 40–120)
ALT FLD-CCNC: 14 U/L — SIGNIFICANT CHANGE UP (ref 10–45)
ANION GAP SERPL CALC-SCNC: 8 MMOL/L — SIGNIFICANT CHANGE UP (ref 5–17)
AST SERPL-CCNC: 18 U/L — SIGNIFICANT CHANGE UP (ref 10–40)
BASOPHILS # BLD AUTO: 0.03 K/UL — SIGNIFICANT CHANGE UP (ref 0–0.2)
BASOPHILS NFR BLD AUTO: 0.4 % — SIGNIFICANT CHANGE UP (ref 0–2)
BILIRUB SERPL-MCNC: 0.1 MG/DL — LOW (ref 0.2–1.2)
BUN SERPL-MCNC: <4 MG/DL — LOW (ref 7–23)
CALCIUM SERPL-MCNC: 8.9 MG/DL — SIGNIFICANT CHANGE UP (ref 8.4–10.5)
CHLORIDE SERPL-SCNC: 107 MMOL/L — SIGNIFICANT CHANGE UP (ref 96–108)
CO2 SERPL-SCNC: 25 MMOL/L — SIGNIFICANT CHANGE UP (ref 22–31)
CREAT SERPL-MCNC: 0.81 MG/DL — SIGNIFICANT CHANGE UP (ref 0.5–1.3)
CULTURE RESULTS: SIGNIFICANT CHANGE UP
EOSINOPHIL # BLD AUTO: 0.31 K/UL — SIGNIFICANT CHANGE UP (ref 0–0.5)
EOSINOPHIL NFR BLD AUTO: 4.6 % — SIGNIFICANT CHANGE UP (ref 0–6)
GLUCOSE SERPL-MCNC: 95 MG/DL — SIGNIFICANT CHANGE UP (ref 70–99)
HCT VFR BLD CALC: 36.8 % — SIGNIFICANT CHANGE UP (ref 34.5–45)
HGB BLD-MCNC: 12.4 G/DL — SIGNIFICANT CHANGE UP (ref 11.5–15.5)
IMM GRANULOCYTES NFR BLD AUTO: 0.4 % — SIGNIFICANT CHANGE UP (ref 0–1.5)
LYMPHOCYTES # BLD AUTO: 2.62 K/UL — SIGNIFICANT CHANGE UP (ref 1–3.3)
LYMPHOCYTES # BLD AUTO: 38.6 % — SIGNIFICANT CHANGE UP (ref 13–44)
MCHC RBC-ENTMCNC: 29.1 PG — SIGNIFICANT CHANGE UP (ref 27–34)
MCHC RBC-ENTMCNC: 33.7 GM/DL — SIGNIFICANT CHANGE UP (ref 32–36)
MCV RBC AUTO: 86.4 FL — SIGNIFICANT CHANGE UP (ref 80–100)
MONOCYTES # BLD AUTO: 0.59 K/UL — SIGNIFICANT CHANGE UP (ref 0–0.9)
MONOCYTES NFR BLD AUTO: 8.7 % — SIGNIFICANT CHANGE UP (ref 2–14)
NEUTROPHILS # BLD AUTO: 3.2 K/UL — SIGNIFICANT CHANGE UP (ref 1.8–7.4)
NEUTROPHILS NFR BLD AUTO: 47.3 % — SIGNIFICANT CHANGE UP (ref 43–77)
PLATELET # BLD AUTO: 255 K/UL — SIGNIFICANT CHANGE UP (ref 150–400)
POTASSIUM SERPL-MCNC: 3.7 MMOL/L — SIGNIFICANT CHANGE UP (ref 3.5–5.3)
POTASSIUM SERPL-SCNC: 3.7 MMOL/L — SIGNIFICANT CHANGE UP (ref 3.5–5.3)
PROT SERPL-MCNC: 5.5 G/DL — LOW (ref 6–8.3)
RBC # BLD: 4.26 M/UL — SIGNIFICANT CHANGE UP (ref 3.8–5.2)
RBC # FLD: 12.4 % — SIGNIFICANT CHANGE UP (ref 10.3–14.5)
SODIUM SERPL-SCNC: 140 MMOL/L — SIGNIFICANT CHANGE UP (ref 135–145)
SPECIMEN SOURCE: SIGNIFICANT CHANGE UP
WBC # BLD: 6.78 K/UL — SIGNIFICANT CHANGE UP (ref 3.8–10.5)
WBC # FLD AUTO: 6.78 K/UL — SIGNIFICANT CHANGE UP (ref 3.8–10.5)

## 2019-04-10 RX ADMIN — SODIUM CHLORIDE 3 MILLILITER(S): 9 INJECTION INTRAMUSCULAR; INTRAVENOUS; SUBCUTANEOUS at 05:00

## 2019-04-10 RX ADMIN — SODIUM CHLORIDE 75 MILLILITER(S): 9 INJECTION, SOLUTION INTRAVENOUS at 05:01

## 2019-04-10 RX ADMIN — SODIUM CHLORIDE 3 MILLILITER(S): 9 INJECTION INTRAMUSCULAR; INTRAVENOUS; SUBCUTANEOUS at 21:28

## 2019-04-10 RX ADMIN — FAMOTIDINE 20 MILLIGRAM(S): 10 INJECTION INTRAVENOUS at 05:01

## 2019-04-10 RX ADMIN — SODIUM CHLORIDE 3 MILLILITER(S): 9 INJECTION INTRAMUSCULAR; INTRAVENOUS; SUBCUTANEOUS at 13:07

## 2019-04-10 NOTE — PROGRESS NOTE ADULT - ASSESSMENT
38f with no pmh admitted with   abd pain and diarrhea  nontoxic, afeb, normal wbc  found to have norovirus-- self limiting process   but there is concern for some bowel wall thickening on CT scan  GI eval pending

## 2019-04-10 NOTE — DISCHARGE NOTE PROVIDER - PROVIDER TOKENS
PROVIDER:[TOKEN:[[id number]],FOLLOWUP:[1 week]],PROVIDER:[TOKEN:[[id number]],FOLLOWUP:[1 month]]

## 2019-04-10 NOTE — CONSULT NOTE ADULT - ASSESSMENT
39 yo F w/ abd pain and diarrhea. Stool testing shows Norovirus which explains her symptoms.  Finding of L sided colitis on imaging - unusual for norovirus to cause segmental colitis. Patient states she started OCPs 4 mos ago. The OCPs have been causing dysfunctional uterine bleeding and patient is considering d/c'ing them especially now that she has had an interruption in her pills.  Recs:  Lactose free diet  Supportive care  Probiotics  DC planning as she is tolerating PO and not having diarrhea  Outpatient GI follow up in 4 weeks.   Agree w/ holding OCPs as this may cause segmental colitis

## 2019-04-10 NOTE — PROGRESS NOTE ADULT - ASSESSMENT
38 F current smoker, no personal or familail hx of IBD presenting with  9 days of intermittent abdominal pain, 10/10 at times diffuse associatd with chills but no documented fever .. and with multiple episodes of watery diarrhea and vomitting.  no relieving or exacerbating factors       1- abd pain : CT showing colitis   no fever  and no elevated WBC however sx for 10 days  observed off abx however pt will likely need a colonoscopy in 4-6 weeks  pain meds , adv diet to LR LF diet  GI cs-fu  appreciate ID input         2- smoking: counceling for smoking cessation     3- N/V :  zofran prn   resolved

## 2019-04-10 NOTE — PROGRESS NOTE ADULT - SUBJECTIVE AND OBJECTIVE BOX
[facility], Division of Infectious Diseases  ZAHRA [address] Lee  [phone]    Name: TYLER ELDRIDGE  Age: 38y  Gender: Female  MRN: [id number]    Interval History--  Notes reviewed. Feeling better overall. No fevers, chills, or rigors. Immediately after drinking a shake had a tiny diarrheal BM. No hematochezia or melena. No abdominal pain. No N/V. No other complaints.     Past Medical History--  Ectopic pregnancy  Sudden infant death  Missed   Hypertension in pregnancy  No Past Medical History  H/O unilateral salpingectomy  H/O:   No Past Surgical History  No Past Surgical History      For details regarding the patient's social history, family history, and other miscellaneous elements, please refer the initial infectious diseases consultation and/or the admitting history and physical examination for this admission.    Allergies    No Known Allergies    Intolerances        Medications--  Antibiotics:    Immunologic:    Other:  sodium chloride 0.9% lock flush      Review of Systems--  A 10-point review of systems was obtained.   Review of systems otherwise negative except as previously noted.    Physical Examination--  Vital Signs: T(F): 98.5 (04-10-19 @ 09:30), Max: 98.8 (19 @ 13:35)  HR: 65 (04-10-19 @ 09:30)  BP: 134/83 (04-10-19 @ 09:30)  RR: 18 (04-10-19 @ 09:30)  SpO2: 94% (04-10-19 @ 09:30)  Wt(kg): --  General: Nontoxic-appearing Female in no acute distress.  HEENT: AT/NC. Anicteric. Conjunctiva pink and moist. Oropharynx clear. Dentition fair.  Neck: Not rigid. No sense of mass.  Nodes: None palpable.  Lungs: Clear bilaterally without rales, wheezing or rhonchi  Heart: Regular rate and rhythm. No Murmur. No rub. No gallop. No palpable thrill.  Abdomen: Bowel sounds present and normoactive. Soft. Nondistended. Mild LLQ to suprapubic tenderness. No G/R. No sense of mass. No organomegaly.  Back: No spinal tenderness. No costovertebral angle tenderness.   Extremities: No cyanosis or clubbing. No edema.   Skin: Warm. Dry. Good turgor. No rash. No vasculitic stigmata.  Psychiatric: Appropriate affect and mood for situation.       Laboratory Studies--  CBC                        12.4   6.78  )-----------( 255      ( 10 Apr 2019 09:29 )             36.8     Chemistries  04-10    140  |  107  |  <4<L>  ----------------------------<  95  3.7   |  25  |  0.81    Ca    8.9      10 Apr 2019 07:19    TPro  5.5<L>  /  Alb  3.6  /  TBili  0.1<L>  /  DBili  x   /  AST  18  /  ALT  14  /  AlkPhos  39<L>  04-10      Culture Data    GI PCR Panel, Stool (collected 2019 13:59)  Source: .Stool  Final Report (2019 19:14):    Norovirus GI/GII    DETECTED by PCR    *******Please Note:*******    GI panel PCR evaluates for:    Campylobacter, Plesiomonas shigelloides, Salmonella,    Vibrio, Yersinia enterocolitica, Enteroaggregative    Escherichia coli (EAEC), Enteropathogenic E.coli (EPEC),    Enterotoxigenic E. coli (ETEC) lt/st, Shiga-like    toxin-producing E. coli (STEC) stx1/stx2,    Shigella/ Enteroinvasive E. coli (EIEC), Cryptosporidium,    Cyclospora cayetanensis, Entamoeba histolytica,    Giardia lamblia, Adenovirus F 40/41, Astrovirus,    Norovirus GI/GII, Rotavirus A, Sapovirus    Culture - Stool (collected 2019 13:59)  Source: .Stool Feces  Preliminary Report (2019 17:18):    No enteric pathogens to date: Final culture pending

## 2019-04-10 NOTE — CONSULT NOTE ADULT - SUBJECTIVE AND OBJECTIVE BOX
39 yo F smoker admitted w/ 9 days of intermittent abdominal pain, multiple episodes of watery diarrhea.  Recently started OCPs 4 mos ago.  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  CT abd p consistent with L sided colitis       PAST MEDICAL & SURGICAL HISTORY:  Ectopic pregnancy  Sudden infant death: 16 at 7 weeks (boy)  Missed   Hypertension in pregnancy  No Past Medical History  H/O unilateral salpingectomy: 3/10/18  H/O: : x2  No Past Surgical History      MEDICATIONS  (STANDING):  sodium chloride 0.9% lock flush 3 milliLiter(s) IV Push every 8 hours    MEDICATIONS  (PRN):      Allergies    No Known Allergies    Intolerances        Review of Systems:    General:  No wt loss, fevers, chills, night sweats,fatigue,   CV:  No pain, palpitatioins, hypo/hypertension  Resp:  No dyspnea, cough, tachypnea, wheezing  GI:  see hpi  :  No pain, bleeding, incontinence, nocturia  Muscle:  No pain, weakness  Neuro:  No weakness, tingling, memory problems  Psych:  No fatigue, insomnia, mood problems, depression  Endocrine:  No polyuria, polydypsia, cold/heat intolerance  Heme:  No petechiae, ecchymosis, easy bruisability  Skin:  No rash, tattoos, scars, edema      Vital Signs Last 24 Hrs  T(C): 37 (10 Apr 2019 13:30), Max: 37.1 (2019 17:11)  T(F): 98.6 (10 Apr 2019 13:30), Max: 98.8 (2019 17:11)  HR: 63 (10 Apr 2019 13:30) (63 - 72)  BP: 132/82 (10 Apr 2019 13:30) (110/69 - 134/83)  BP(mean): --  RR: 18 (10 Apr 2019 13:30) (18 - 18)  SpO2: 95% (10 Apr 2019 13:30) (94% - 97%)    PHYSICAL EXAM:    Constitutional: NAD, well-developed  Neck: No LAD, supple  Respiratory: CTA and P  Cardiovascular: S1 and S2, RRR, no M  Gastrointestinal: BS+, soft, mildly tender to palp  Extremities: No peripheral edema, neg clubing, cyanosis  Vascular: 2+ peripheral pulses  Neurological: A/O x 3, no focal deficits  Psychiatric: Normal mood, normal affect  Skin: No rashes      LABS:                        12.4   6.78  )-----------( 255      ( 10 Apr 2019 09:29 )             36.8                         12.0   4.51  )-----------( 218      ( 2019 09:33 )             36.5                         12.9   6.6   )-----------( 235      ( 2019 02:20 )             38.1     04-10    140  |  107  |  <4<L>  ----------------------------<  95  3.7   |  25  |  0.81    Ca    8.9      10 Apr 2019 07:19    TPro  5.5<L>  /  Alb  3.6  /  TBili  0.1<L>  /  DBili  x   /  AST  18  /  ALT  14  /  AlkPhos  39<L>  04-10        LIVER FUNCTIONS - ( 10 Apr 2019 07:19 )  Alb: 3.6 g/dL / Pro: 5.5 g/dL / ALK PHOS: 39 U/L / ALT: 14 U/L / AST: 18 U/L / GGT: x         LIVER FUNCTIONS - ( 2019 17:43 )  Alb: 4.7 g/dL / Pro: 7.6 g/dL / ALK PHOS: 53 U/L / ALT: 20 U/L / AST: 32 U/L / GGT: x                 RADIOLOGY & ADDITIONAL TESTS:

## 2019-04-10 NOTE — DISCHARGE NOTE PROVIDER - HOSPITAL COURSE
38 F current smoker, no personal or familial hx of IBD presenting with  9 days of intermittent abdominal pain, 10/10 at times diffuse associated with chills but no documented fever, with multiple episodes of watery diarrhea and vomiting.    Patient admitted with intractable diarrhea, abdominal pain    CT abd : Bowel wall thickening of the descending and sigmoid colon which may     reflect accentuation due to underdistention or findings of colitis.    Patient was started on Cipro and Flagyl IV. ID consulted:     no fever  and no elevated WBC however sx for 10 days    observed off abx however pt will likely need a colonoscopy in 4-6 weeks    pain meds , advanced diet to LR LF diet, which patient tolerated.     GI consulted and patient will follow up outpatient     smoking: counseling for smoking cessation. Patient refused treatment.     Discharge planing home. Outpatient plan discussed with PCP for continued care.

## 2019-04-10 NOTE — PROGRESS NOTE ADULT - SUBJECTIVE AND OBJECTIVE BOX
Patient is a 38y old  Female who presents with a chief complaint of n/v diarreah (09 Apr 2019 22:34)      INTERVAL HPI/OVERNIGHT EVENTS: improved diarrhea-1 x bm  this am  c/o abd discomfort and nausea after the milk shake for breakfast      Vital Signs Last 24 Hrs  T(C): 36.9 (10 Apr 2019 09:30), Max: 37.1 (09 Apr 2019 13:35)  T(F): 98.5 (10 Apr 2019 09:30), Max: 98.8 (09 Apr 2019 13:35)  HR: 65 (10 Apr 2019 09:30) (65 - 72)  BP: 134/83 (10 Apr 2019 09:30) (110/69 - 134/83)  BP(mean): --  RR: 18 (10 Apr 2019 09:30) (18 - 18)  SpO2: 94% (10 Apr 2019 09:30) (94% - 97%)    sodium chloride 0.9% lock flush 3 milliLiter(s) IV Push every 8 hours      PHYSICAL EXAM:  GENERAL: NAD,   EYES: conjunctiva and sclera clear  ENMT: Moist mucous membranes  NECK: Supple, No JVD, Normal thyroid  NERVOUS SYSTEM:  Alert & Oriented X3,   CHEST/LUNG: Clear to auscultation bilaterally; No rales, rhonchi, wheezing, or rubs  HEART: Regular rate and rhythm; No murmurs, rubs, or gallops  ABDOMEN: Soft, Nontender, Nondistended; Bowel sounds present  EXTREMITIES:  2+ Peripheral Pulses, No clubbing, cyanosis, or edema  LYMPH: No lymphadenopathy noted  SKIN: No rashes or lesions    Consultant(s) Notes Reviewed:  [x ] YES  [ ] NO  Care Discussed with Consultants/Other Providers [ x] YES  [ ] NO    LABS:                        12.4   6.78  )-----------( 255      ( 10 Apr 2019 09:29 )             36.8     04-10    140  |  107  |  <4<L>  ----------------------------<  95  3.7   |  25  |  0.81    Ca    8.9      10 Apr 2019 07:19    TPro  5.5<L>  /  Alb  3.6  /  TBili  0.1<L>  /  DBili  x   /  AST  18  /  ALT  14  /  AlkPhos  39<L>  04-10        CAPILLARY BLOOD GLUCOSE                RADIOLOGY & ADDITIONAL TESTS:    Imaging Personally Reviewed:  [ x] YES  [ ] NO

## 2019-04-10 NOTE — DISCHARGE NOTE PROVIDER - NSDCCPCAREPLAN_GEN_ALL_CORE_FT
PRINCIPAL DISCHARGE DIAGNOSIS  Diagnosis: Enteritis due to Norovirus  Assessment and Plan of Treatment: Supportive care. Follow up with gastroenterology outpatient. Return to hospital if worsens      SECONDARY DISCHARGE DIAGNOSES  Diagnosis: Diarrhea  Assessment and Plan of Treatment: improved. Awaiting final sensitivity. Follow up iwth PCP in 1 week. Follow up with GI in 4 weeks. You will need outpatient colonscopy. Follow abnoraml CT abdomen results with gastroenterology    Diagnosis: Dehydration, mild  Assessment and Plan of Treatment: resolved

## 2019-04-11 ENCOUNTER — TRANSCRIPTION ENCOUNTER (OUTPATIENT)
Age: 39
End: 2019-04-11

## 2019-04-11 VITALS
OXYGEN SATURATION: 97 % | TEMPERATURE: 98 F | HEART RATE: 75 BPM | SYSTOLIC BLOOD PRESSURE: 124 MMHG | DIASTOLIC BLOOD PRESSURE: 87 MMHG | RESPIRATION RATE: 18 BRPM

## 2019-04-11 LAB
ALBUMIN SERPL ELPH-MCNC: 3.8 G/DL — SIGNIFICANT CHANGE UP (ref 3.3–5)
ALP SERPL-CCNC: 41 U/L — SIGNIFICANT CHANGE UP (ref 40–120)
ALT FLD-CCNC: 21 U/L — SIGNIFICANT CHANGE UP (ref 10–45)
ANION GAP SERPL CALC-SCNC: 14 MMOL/L — SIGNIFICANT CHANGE UP (ref 5–17)
AST SERPL-CCNC: 23 U/L — SIGNIFICANT CHANGE UP (ref 10–40)
BASOPHILS # BLD AUTO: 0.04 K/UL — SIGNIFICANT CHANGE UP (ref 0–0.2)
BASOPHILS NFR BLD AUTO: 0.5 % — SIGNIFICANT CHANGE UP (ref 0–2)
BILIRUB SERPL-MCNC: 0.2 MG/DL — SIGNIFICANT CHANGE UP (ref 0.2–1.2)
BUN SERPL-MCNC: 10 MG/DL — SIGNIFICANT CHANGE UP (ref 7–23)
CALCIUM SERPL-MCNC: 9.2 MG/DL — SIGNIFICANT CHANGE UP (ref 8.4–10.5)
CHLORIDE SERPL-SCNC: 105 MMOL/L — SIGNIFICANT CHANGE UP (ref 96–108)
CO2 SERPL-SCNC: 23 MMOL/L — SIGNIFICANT CHANGE UP (ref 22–31)
CREAT SERPL-MCNC: 0.84 MG/DL — SIGNIFICANT CHANGE UP (ref 0.5–1.3)
CULTURE RESULTS: SIGNIFICANT CHANGE UP
EOSINOPHIL # BLD AUTO: 0.3 K/UL — SIGNIFICANT CHANGE UP (ref 0–0.5)
EOSINOPHIL NFR BLD AUTO: 3.5 % — SIGNIFICANT CHANGE UP (ref 0–6)
GLUCOSE SERPL-MCNC: 93 MG/DL — SIGNIFICANT CHANGE UP (ref 70–99)
HCT VFR BLD CALC: 39.2 % — SIGNIFICANT CHANGE UP (ref 34.5–45)
HGB BLD-MCNC: 13.2 G/DL — SIGNIFICANT CHANGE UP (ref 11.5–15.5)
IMM GRANULOCYTES NFR BLD AUTO: 0.4 % — SIGNIFICANT CHANGE UP (ref 0–1.5)
LYMPHOCYTES # BLD AUTO: 3.14 K/UL — SIGNIFICANT CHANGE UP (ref 1–3.3)
LYMPHOCYTES # BLD AUTO: 37.1 % — SIGNIFICANT CHANGE UP (ref 13–44)
MCHC RBC-ENTMCNC: 29.2 PG — SIGNIFICANT CHANGE UP (ref 27–34)
MCHC RBC-ENTMCNC: 33.7 GM/DL — SIGNIFICANT CHANGE UP (ref 32–36)
MCV RBC AUTO: 86.7 FL — SIGNIFICANT CHANGE UP (ref 80–100)
MONOCYTES # BLD AUTO: 0.68 K/UL — SIGNIFICANT CHANGE UP (ref 0–0.9)
MONOCYTES NFR BLD AUTO: 8 % — SIGNIFICANT CHANGE UP (ref 2–14)
NEUTROPHILS # BLD AUTO: 4.28 K/UL — SIGNIFICANT CHANGE UP (ref 1.8–7.4)
NEUTROPHILS NFR BLD AUTO: 50.5 % — SIGNIFICANT CHANGE UP (ref 43–77)
PLATELET # BLD AUTO: 274 K/UL — SIGNIFICANT CHANGE UP (ref 150–400)
POTASSIUM SERPL-MCNC: 3.8 MMOL/L — SIGNIFICANT CHANGE UP (ref 3.5–5.3)
POTASSIUM SERPL-SCNC: 3.8 MMOL/L — SIGNIFICANT CHANGE UP (ref 3.5–5.3)
PROT SERPL-MCNC: 5.9 G/DL — LOW (ref 6–8.3)
RBC # BLD: 4.52 M/UL — SIGNIFICANT CHANGE UP (ref 3.8–5.2)
RBC # FLD: 12.4 % — SIGNIFICANT CHANGE UP (ref 10.3–14.5)
SODIUM SERPL-SCNC: 142 MMOL/L — SIGNIFICANT CHANGE UP (ref 135–145)
SPECIMEN SOURCE: SIGNIFICANT CHANGE UP
WBC # BLD: 8.47 K/UL — SIGNIFICANT CHANGE UP (ref 3.8–10.5)
WBC # FLD AUTO: 8.47 K/UL — SIGNIFICANT CHANGE UP (ref 3.8–10.5)

## 2019-04-11 PROCEDURE — 80053 COMPREHEN METABOLIC PANEL: CPT

## 2019-04-11 PROCEDURE — 85027 COMPLETE CBC AUTOMATED: CPT

## 2019-04-11 PROCEDURE — 81001 URINALYSIS AUTO W/SCOPE: CPT

## 2019-04-11 PROCEDURE — 84145 PROCALCITONIN (PCT): CPT

## 2019-04-11 PROCEDURE — 83735 ASSAY OF MAGNESIUM: CPT

## 2019-04-11 PROCEDURE — 82435 ASSAY OF BLOOD CHLORIDE: CPT

## 2019-04-11 PROCEDURE — 83690 ASSAY OF LIPASE: CPT

## 2019-04-11 PROCEDURE — 96375 TX/PRO/DX INJ NEW DRUG ADDON: CPT

## 2019-04-11 PROCEDURE — 96374 THER/PROPH/DIAG INJ IV PUSH: CPT | Mod: XU

## 2019-04-11 PROCEDURE — 83605 ASSAY OF LACTIC ACID: CPT

## 2019-04-11 PROCEDURE — 82803 BLOOD GASES ANY COMBINATION: CPT

## 2019-04-11 PROCEDURE — 84132 ASSAY OF SERUM POTASSIUM: CPT

## 2019-04-11 PROCEDURE — 87177 OVA AND PARASITES SMEARS: CPT

## 2019-04-11 PROCEDURE — G0378: CPT

## 2019-04-11 PROCEDURE — 84295 ASSAY OF SERUM SODIUM: CPT

## 2019-04-11 PROCEDURE — 76705 ECHO EXAM OF ABDOMEN: CPT

## 2019-04-11 PROCEDURE — 74177 CT ABD & PELVIS W/CONTRAST: CPT

## 2019-04-11 PROCEDURE — 87046 STOOL CULTR AEROBIC BACT EA: CPT

## 2019-04-11 PROCEDURE — 87045 FECES CULTURE AEROBIC BACT: CPT

## 2019-04-11 PROCEDURE — 87507 IADNA-DNA/RNA PROBE TQ 12-25: CPT

## 2019-04-11 PROCEDURE — 82330 ASSAY OF CALCIUM: CPT

## 2019-04-11 PROCEDURE — 99285 EMERGENCY DEPT VISIT HI MDM: CPT | Mod: 25

## 2019-04-11 PROCEDURE — 85014 HEMATOCRIT: CPT

## 2019-04-11 PROCEDURE — 80048 BASIC METABOLIC PNL TOTAL CA: CPT

## 2019-04-11 PROCEDURE — 82947 ASSAY GLUCOSE BLOOD QUANT: CPT

## 2019-04-11 PROCEDURE — 96376 TX/PRO/DX INJ SAME DRUG ADON: CPT

## 2019-04-11 PROCEDURE — 81025 URINE PREGNANCY TEST: CPT

## 2019-04-11 RX ADMIN — SODIUM CHLORIDE 3 MILLILITER(S): 9 INJECTION INTRAMUSCULAR; INTRAVENOUS; SUBCUTANEOUS at 05:09

## 2019-04-11 NOTE — DISCHARGE NOTE NURSING/CASE MANAGEMENT/SOCIAL WORK - NSDCDPATPORTLINK_GEN_ALL_CORE
You can access the [facility] Patient Portal, offered by [facility], by registering with the following website: [email or web address]

## 2019-08-13 NOTE — DISCHARGE NOTE PROVIDER - NS AS DC PROVIDER CONTACT Y/N MULTI
Yes Clindamycin Pregnancy And Lactation Text: This medication can be used in pregnancy if certain situations. Clindamycin is also present in breast milk.

## 2019-08-26 ENCOUNTER — OUTPATIENT (OUTPATIENT)
Dept: OUTPATIENT SERVICES | Facility: HOSPITAL | Age: 39
LOS: 1 days | End: 2019-08-26
Payer: COMMERCIAL

## 2019-08-26 ENCOUNTER — APPOINTMENT (OUTPATIENT)
Dept: ULTRASOUND IMAGING | Facility: IMAGING CENTER | Age: 39
End: 2019-08-26
Payer: COMMERCIAL

## 2019-08-26 ENCOUNTER — RESULT REVIEW (OUTPATIENT)
Age: 39
End: 2019-08-26

## 2019-08-26 DIAGNOSIS — R92.8 OTHER ABNORMAL AND INCONCLUSIVE FINDINGS ON DIAGNOSTIC IMAGING OF BREAST: ICD-10-CM

## 2019-08-26 DIAGNOSIS — Z98.890 OTHER SPECIFIED POSTPROCEDURAL STATES: Chronic | ICD-10-CM

## 2019-08-26 DIAGNOSIS — Z98.891 HISTORY OF UTERINE SCAR FROM PREVIOUS SURGERY: Chronic | ICD-10-CM

## 2019-08-26 PROCEDURE — 77065 DX MAMMO INCL CAD UNI: CPT

## 2019-08-26 PROCEDURE — 77065 DX MAMMO INCL CAD UNI: CPT | Mod: 26,RT

## 2019-08-26 PROCEDURE — 19083 BX BREAST 1ST LESION US IMAG: CPT

## 2019-08-26 PROCEDURE — A4648: CPT

## 2019-08-26 PROCEDURE — 19083 BX BREAST 1ST LESION US IMAG: CPT | Mod: RT

## 2019-08-26 PROCEDURE — 88305 TISSUE EXAM BY PATHOLOGIST: CPT | Mod: 26

## 2019-08-26 PROCEDURE — 88305 TISSUE EXAM BY PATHOLOGIST: CPT

## 2019-12-12 NOTE — H&P ADULT - PROBLEM SELECTOR PLAN 1
Insertion of IUD  Date/Time: 12/12/2019 1:30 PM  Performed by: Charlotte Acosta CNM  Authorized by: Charlotte Acosta CNM     Consent:     Consent obtained:  Written    Consent given by:  Patient    Procedure risks and benefits discussed: yes      Patient questions answered: yes      Patient agrees, verbalizes understanding, and wants to proceed: yes      Educational handouts given: yes      Instructions and paperwork completed: yes    Procedure:     Pelvic exam performed: no      Negative GC/chlamydia test: yes      Negative urine pregnancy test: yes      Cervix cleaned and prepped: yes      Speculum placed in vagina: yes      Tenaculum applied to cervix: yes      Uterus sounded: yes      Uterus sound depth (cm):  9    IUD inserted with no complications: yes      Strings trimmed: yes    Post-procedure:     Patient tolerated procedure well: yes      Patient will follow up after next period: yes         Neuro: IV pain medication prn  CV: patient hemodynamically stable- will continue to monitor vitals closely.   Pulm: saturating well on room air   GI: NPO for OR   : Blake to be placed intra-operatively.   Reproductive: Ectopic pregnancy (not candidate for medical therapy):  patient to go to OR for diagnostic laparoscopy, unilateral salpingectomy,possible dilation and curretage  Patient counseled on risks of surgery including bleeding, infection and damage to surrounding organs.  All questions/concerns of patient addressed. All consents signed with patient.    Heme: SCD's in OR for DVT ppx.  Aggressive and early ambulation post-operatively for DVT ppx.   ID: afebrile   FEN: LR@125.  Replete electrolytes prn   Dispo: To OR for procedure as detailed above    SALVADOR Green, PGY2  #[id number]

## 2020-04-13 ENCOUNTER — APPOINTMENT (OUTPATIENT)
Dept: MAMMOGRAPHY | Facility: IMAGING CENTER | Age: 40
End: 2020-04-13

## 2020-04-13 ENCOUNTER — APPOINTMENT (OUTPATIENT)
Dept: ULTRASOUND IMAGING | Facility: IMAGING CENTER | Age: 40
End: 2020-04-13

## 2020-11-03 ENCOUNTER — RESULT REVIEW (OUTPATIENT)
Age: 40
End: 2020-11-03

## 2021-04-24 ENCOUNTER — APPOINTMENT (OUTPATIENT)
Dept: MRI IMAGING | Facility: IMAGING CENTER | Age: 41
End: 2021-04-24
Payer: COMMERCIAL

## 2021-04-24 ENCOUNTER — OUTPATIENT (OUTPATIENT)
Dept: OUTPATIENT SERVICES | Facility: HOSPITAL | Age: 41
LOS: 1 days | End: 2021-04-24
Payer: COMMERCIAL

## 2021-04-24 DIAGNOSIS — Z91.89 OTHER SPECIFIED PERSONAL RISK FACTORS, NOT ELSEWHERE CLASSIFIED: ICD-10-CM

## 2021-04-24 DIAGNOSIS — Z98.890 OTHER SPECIFIED POSTPROCEDURAL STATES: Chronic | ICD-10-CM

## 2021-04-24 DIAGNOSIS — Z00.8 ENCOUNTER FOR OTHER GENERAL EXAMINATION: ICD-10-CM

## 2021-04-24 DIAGNOSIS — Z98.891 HISTORY OF UTERINE SCAR FROM PREVIOUS SURGERY: Chronic | ICD-10-CM

## 2021-04-24 PROCEDURE — C8937: CPT

## 2021-04-24 PROCEDURE — 77049 MRI BREAST C-+ W/CAD BI: CPT | Mod: 26

## 2021-04-24 PROCEDURE — A9585: CPT

## 2021-04-24 PROCEDURE — C8908: CPT

## 2021-08-16 ENCOUNTER — OUTPATIENT (OUTPATIENT)
Dept: OUTPATIENT SERVICES | Facility: HOSPITAL | Age: 41
LOS: 1 days | End: 2021-08-16
Payer: COMMERCIAL

## 2021-08-16 VITALS
HEART RATE: 90 BPM | WEIGHT: 184.97 LBS | HEIGHT: 66 IN | RESPIRATION RATE: 16 BRPM | TEMPERATURE: 99 F | DIASTOLIC BLOOD PRESSURE: 88 MMHG | OXYGEN SATURATION: 98 % | SYSTOLIC BLOOD PRESSURE: 128 MMHG

## 2021-08-16 DIAGNOSIS — N92.0 EXCESSIVE AND FREQUENT MENSTRUATION WITH REGULAR CYCLE: ICD-10-CM

## 2021-08-16 DIAGNOSIS — Z98.890 OTHER SPECIFIED POSTPROCEDURAL STATES: Chronic | ICD-10-CM

## 2021-08-16 DIAGNOSIS — Z01.818 ENCOUNTER FOR OTHER PREPROCEDURAL EXAMINATION: ICD-10-CM

## 2021-08-16 DIAGNOSIS — Z98.891 HISTORY OF UTERINE SCAR FROM PREVIOUS SURGERY: Chronic | ICD-10-CM

## 2021-08-16 LAB
ANION GAP SERPL CALC-SCNC: 15 MMOL/L — SIGNIFICANT CHANGE UP (ref 5–17)
BLD GP AB SCN SERPL QL: NEGATIVE — SIGNIFICANT CHANGE UP
BUN SERPL-MCNC: 12 MG/DL — SIGNIFICANT CHANGE UP (ref 7–23)
CALCIUM SERPL-MCNC: 10.3 MG/DL — SIGNIFICANT CHANGE UP (ref 8.4–10.5)
CHLORIDE SERPL-SCNC: 102 MMOL/L — SIGNIFICANT CHANGE UP (ref 96–108)
CO2 SERPL-SCNC: 19 MMOL/L — LOW (ref 22–31)
CREAT SERPL-MCNC: 0.8 MG/DL — SIGNIFICANT CHANGE UP (ref 0.5–1.3)
GLUCOSE SERPL-MCNC: 91 MG/DL — SIGNIFICANT CHANGE UP (ref 70–99)
HCT VFR BLD CALC: 44.4 % — SIGNIFICANT CHANGE UP (ref 34.5–45)
HGB BLD-MCNC: 14.8 G/DL — SIGNIFICANT CHANGE UP (ref 11.5–15.5)
MCHC RBC-ENTMCNC: 29.2 PG — SIGNIFICANT CHANGE UP (ref 27–34)
MCHC RBC-ENTMCNC: 33.3 GM/DL — SIGNIFICANT CHANGE UP (ref 32–36)
MCV RBC AUTO: 87.6 FL — SIGNIFICANT CHANGE UP (ref 80–100)
NRBC # BLD: 0 /100 WBCS — SIGNIFICANT CHANGE UP (ref 0–0)
PLATELET # BLD AUTO: 354 K/UL — SIGNIFICANT CHANGE UP (ref 150–400)
POTASSIUM SERPL-MCNC: 3.7 MMOL/L — SIGNIFICANT CHANGE UP (ref 3.5–5.3)
POTASSIUM SERPL-SCNC: 3.7 MMOL/L — SIGNIFICANT CHANGE UP (ref 3.5–5.3)
RBC # BLD: 5.07 M/UL — SIGNIFICANT CHANGE UP (ref 3.8–5.2)
RBC # FLD: 13.2 % — SIGNIFICANT CHANGE UP (ref 10.3–14.5)
RH IG SCN BLD-IMP: POSITIVE — SIGNIFICANT CHANGE UP
SODIUM SERPL-SCNC: 136 MMOL/L — SIGNIFICANT CHANGE UP (ref 135–145)
WBC # BLD: 12.37 K/UL — HIGH (ref 3.8–10.5)
WBC # FLD AUTO: 12.37 K/UL — HIGH (ref 3.8–10.5)

## 2021-08-16 PROCEDURE — 86901 BLOOD TYPING SEROLOGIC RH(D): CPT

## 2021-08-16 PROCEDURE — G0463: CPT

## 2021-08-16 PROCEDURE — 85027 COMPLETE CBC AUTOMATED: CPT

## 2021-08-16 PROCEDURE — 86850 RBC ANTIBODY SCREEN: CPT

## 2021-08-16 PROCEDURE — 80048 BASIC METABOLIC PNL TOTAL CA: CPT

## 2021-08-16 PROCEDURE — 86900 BLOOD TYPING SEROLOGIC ABO: CPT

## 2021-08-16 RX ORDER — LIDOCAINE HCL 20 MG/ML
0.2 VIAL (ML) INJECTION ONCE
Refills: 0 | Status: DISCONTINUED | OUTPATIENT
Start: 2021-08-23 | End: 2021-09-07

## 2021-08-16 RX ORDER — SODIUM CHLORIDE 9 MG/ML
3 INJECTION INTRAMUSCULAR; INTRAVENOUS; SUBCUTANEOUS EVERY 8 HOURS
Refills: 0 | Status: DISCONTINUED | OUTPATIENT
Start: 2021-08-23 | End: 2021-09-07

## 2021-08-16 NOTE — H&P PST ADULT - ACTIVITY
walks 5-6 blocks a day, climbs 5 flights of stairs ,do household chores without any chest pain or dyspnea

## 2021-08-16 NOTE — H&P PST ADULT - NSICDXPASTMEDICALHX_GEN_ALL_CORE_FT
PAST MEDICAL HISTORY:  Anxiety     Death of infant 2 and  half month old/ not SIDS as per pt    Ectopic pregnancy     Hemorrhoids     Hypertension in pregnancy     Lumbar herniated disc     Missed

## 2021-08-16 NOTE — H&P PST ADULT - HISTORY OF PRESENT ILLNESS
40 year old female with PMH of Gestational hypertension, anxiety, lumbar herniated disc, missed abortions presented to [facility] for pre op evaluation for D&C Diagnostic hysteroscopy, endometrial ablation on 08/23/21. Pt has menorrhagia and pelvic discomfort for 3 years and it was affecting her quality of life. Plan  for endometrial ablation. Denies chest pain, dyspnea, fever, chills, nausea, vomiting.  Denies Recent travel, Exposure or Covid symptoms  Covid PCR test 08/20 @ [facility]

## 2021-08-16 NOTE — H&P PST ADULT - PROBLEM SELECTOR PLAN 1
Scheduled for D&C Diagnostic hysteroscopy  endometrial ablation w/ [place] with Dr.Kaufman Andrew on 08/23/21   pre op instructions given  cbc, bmp Type and screen sent  urin e for pregnancy on DOS

## 2021-08-19 PROBLEM — K64.9 UNSPECIFIED HEMORRHOIDS: Chronic | Status: ACTIVE | Noted: 2021-08-16

## 2021-08-19 PROBLEM — M51.26 OTHER INTERVERTEBRAL DISC DISPLACEMENT, LUMBAR REGION: Chronic | Status: ACTIVE | Noted: 2021-08-16

## 2021-08-19 PROBLEM — F41.9 ANXIETY DISORDER, UNSPECIFIED: Chronic | Status: ACTIVE | Noted: 2021-08-16

## 2021-08-20 ENCOUNTER — OUTPATIENT (OUTPATIENT)
Dept: OUTPATIENT SERVICES | Facility: HOSPITAL | Age: 41
LOS: 1 days | End: 2021-08-20
Payer: COMMERCIAL

## 2021-08-20 DIAGNOSIS — Z98.891 HISTORY OF UTERINE SCAR FROM PREVIOUS SURGERY: Chronic | ICD-10-CM

## 2021-08-20 DIAGNOSIS — Z11.52 ENCOUNTER FOR SCREENING FOR COVID-19: ICD-10-CM

## 2021-08-20 DIAGNOSIS — Z98.890 OTHER SPECIFIED POSTPROCEDURAL STATES: Chronic | ICD-10-CM

## 2021-08-20 LAB — SARS-COV-2 RNA SPEC QL NAA+PROBE: SIGNIFICANT CHANGE UP

## 2021-08-20 PROCEDURE — U0003: CPT

## 2021-08-20 PROCEDURE — U0005: CPT

## 2021-08-20 PROCEDURE — C9803: CPT

## 2021-08-22 ENCOUNTER — TRANSCRIPTION ENCOUNTER (OUTPATIENT)
Age: 41
End: 2021-08-22

## 2021-08-23 ENCOUNTER — RESULT REVIEW (OUTPATIENT)
Age: 41
End: 2021-08-23

## 2021-08-23 ENCOUNTER — OUTPATIENT (OUTPATIENT)
Dept: OUTPATIENT SERVICES | Facility: HOSPITAL | Age: 41
LOS: 1 days | End: 2021-08-23
Payer: COMMERCIAL

## 2021-08-23 VITALS
DIASTOLIC BLOOD PRESSURE: 81 MMHG | RESPIRATION RATE: 16 BRPM | OXYGEN SATURATION: 98 % | TEMPERATURE: 98 F | WEIGHT: 184.97 LBS | SYSTOLIC BLOOD PRESSURE: 123 MMHG | HEIGHT: 66 IN | HEART RATE: 77 BPM

## 2021-08-23 VITALS
DIASTOLIC BLOOD PRESSURE: 59 MMHG | TEMPERATURE: 97 F | OXYGEN SATURATION: 98 % | RESPIRATION RATE: 12 BRPM | HEART RATE: 70 BPM | SYSTOLIC BLOOD PRESSURE: 100 MMHG

## 2021-08-23 DIAGNOSIS — Z98.890 OTHER SPECIFIED POSTPROCEDURAL STATES: Chronic | ICD-10-CM

## 2021-08-23 DIAGNOSIS — Z98.891 HISTORY OF UTERINE SCAR FROM PREVIOUS SURGERY: Chronic | ICD-10-CM

## 2021-08-23 DIAGNOSIS — N92.0 EXCESSIVE AND FREQUENT MENSTRUATION WITH REGULAR CYCLE: ICD-10-CM

## 2021-08-23 LAB — RH IG SCN BLD-IMP: POSITIVE — SIGNIFICANT CHANGE UP

## 2021-08-23 PROCEDURE — 88305 TISSUE EXAM BY PATHOLOGIST: CPT | Mod: 26

## 2021-08-23 PROCEDURE — 58563 HYSTEROSCOPY ABLATION: CPT

## 2021-08-23 PROCEDURE — 88305 TISSUE EXAM BY PATHOLOGIST: CPT

## 2021-08-23 RX ORDER — HYDROMORPHONE HYDROCHLORIDE 2 MG/ML
0.5 INJECTION INTRAMUSCULAR; INTRAVENOUS; SUBCUTANEOUS
Refills: 0 | Status: DISCONTINUED | OUTPATIENT
Start: 2021-08-23 | End: 2021-08-23

## 2021-08-23 RX ORDER — ONDANSETRON 8 MG/1
4 TABLET, FILM COATED ORAL ONCE
Refills: 0 | Status: DISCONTINUED | OUTPATIENT
Start: 2021-08-23 | End: 2021-09-07

## 2021-08-23 RX ORDER — CHOLECALCIFEROL (VITAMIN D3) 125 MCG
1 CAPSULE ORAL
Qty: 0 | Refills: 0 | DISCHARGE

## 2021-08-23 RX ORDER — PSYLLIUM SEED (WITH DEXTROSE)
0 POWDER (GRAM) ORAL
Qty: 0 | Refills: 0 | DISCHARGE

## 2021-08-23 RX ADMIN — HYDROMORPHONE HYDROCHLORIDE 0.5 MILLIGRAM(S): 2 INJECTION INTRAMUSCULAR; INTRAVENOUS; SUBCUTANEOUS at 15:34

## 2021-08-23 NOTE — ASU DISCHARGE PLAN (ADULT/PEDIATRIC) - CALL YOUR DOCTOR IF YOU HAVE ANY OF THE FOLLOWING:
Bleeding that does not stop/Pain not relieved by Medications/Fever greater than (need to indicate Fahrenheit or Celsius)/Numbness, tingling, color or temperature change to extremity/Nausea and vomiting that does not stop/Unable to urinate/Excessive diarrhea/Inability to tolerate liquids or foods

## 2021-08-23 NOTE — BRIEF OPERATIVE NOTE - NSICDXBRIEFPROCEDURE_GEN_ALL_CORE_FT
PROCEDURES:  Dilation and curettage, uterus, with hysteroscopy and NovaSure endometrial ablation 23-Aug-2021 15:23:53  Mayi Poe

## 2021-08-23 NOTE — ASU DISCHARGE PLAN (ADULT/PEDIATRIC) - CARE PROVIDER_API CALL
Kamran Taylor (MD)  Obstetrics and Gynecology  [address] [place] [address], [address]  [place] [address]  Phone: [phone]  Fax: [phone]  Follow Up Time: 2 weeks

## 2021-08-23 NOTE — PRE-ANESTHESIA EVALUATION ADULT - NSANTHOSAYNRD_GEN_A_CORE
No. JMI screening performed.  STOP BANG Legend: 0-2 = LOW Risk; 3-4 = INTERMEDIATE Risk; 5-8 = HIGH Risk

## 2021-08-23 NOTE — ASU DISCHARGE PLAN (ADULT/PEDIATRIC) - NURSING INSTRUCTIONS
**NEXT DOSE OF TYLENOL @0853PM OR LATER AND EVERY 6 HOURS AFTERWARDS AS NEEDED FOR PAIN**  **NEXT DOSE OF IBUPROFEN/MOTRIN @0904PM OR LATER AND EVERY 6 HOURS AFTERWARDS AS NEEDED FOR PAIN**

## 2021-08-23 NOTE — ASU DISCHARGE PLAN (ADULT/PEDIATRIC) - CALL YOUR DOCTOR IF YOU HAVE ANY OF THE FOLLOWING:
Bleeding that does not stop/Pain not relieved by Medications/Fever greater than (need to indicate Fahrenheit or Celsius)/Nausea and vomiting that does not stop/Unable to urinate/Excessive diarrhea

## 2021-08-23 NOTE — BRIEF OPERATIVE NOTE - OPERATION/FINDINGS
Grossly normal external female genitalia. Exam under anesthesia revealed retroverted uterus. The uterine cavity was visualized via hysteroscopy, the ostia were located and noted to be within normal limits bilaterally. Uterine cavity 5.5 cm x 2.5 cm. Gentle dilation and curettage performed with scant endometrial tissue obtained. After firing of novasure was complete, reintroduction of hysteroscope showed uterine cavity to be properly ablated.   Dictation #: [id number]

## 2021-08-23 NOTE — ASU DISCHARGE PLAN (ADULT/PEDIATRIC) - CARE PROVIDER_API CALL
Kamran Taylor (MD)  Obstetrics and Gynecology  [address] [place] [address], [address]  [place] [address]  Phone: [phone]  Fax: [phone]  Follow Up Time: 2 weeks

## 2021-08-30 LAB — SURGICAL PATHOLOGY STUDY: SIGNIFICANT CHANGE UP

## 2021-11-09 ENCOUNTER — APPOINTMENT (OUTPATIENT)
Dept: MAMMOGRAPHY | Facility: CLINIC | Age: 41
End: 2021-11-09
Payer: COMMERCIAL

## 2021-11-09 ENCOUNTER — APPOINTMENT (OUTPATIENT)
Dept: ULTRASOUND IMAGING | Facility: CLINIC | Age: 41
End: 2021-11-09
Payer: COMMERCIAL

## 2021-11-09 PROCEDURE — 77063 BREAST TOMOSYNTHESIS BI: CPT

## 2021-11-09 PROCEDURE — 77067 SCR MAMMO BI INCL CAD: CPT

## 2021-11-09 PROCEDURE — 76641 ULTRASOUND BREAST COMPLETE: CPT | Mod: 50

## 2021-11-09 NOTE — ED ADULT NURSE NOTE - NS ED STAT RN HAND OFF 2
Chart  Review: scheduled 11/11-TF ROBERT and 12/06 for GT bursa.    Routing to PHAN Lucas to review- pt is scheduled with Dr Kaufman- per below OK to schedule with Dr Buenrostro. Is PA provider specific?    Carolyn FUNEZ, RN Care Coordinator  [facility]  Pain Management     Additional handoff

## 2022-03-15 NOTE — ASU DISCHARGE PLAN (ADULT/PEDIATRIC). - LAUNCH MEDICATION RECONCILIATION
<<-----Click here for Discharge Medication Review Debridement Text: The wound edges were debrided prior to proceeding with the closure to facilitate wound healing.

## 2022-04-19 NOTE — PRE-ANESTHESIA EVALUATION ADULT - HEIGHT IN INCHES
Jhon's penis is a normal size and shape. He is in the second stage of puberty (consuelo 2, likely soon to be consuelo 3). He has no adhesions or abnormalities of the penis. A good part of his penis is within his suprapubic fat pad, which makes the \"visible\" portion of his penis appear small, but the total length of his penis is normal and is about 3-4 times the length of the \"visible\" portion that is outside the fat pad.    There is nothing that needs to be done to change this (or indeed, there is nothing that can be done) other than him losing weight and him continuing with puberty (which lengthens the penis and redistributes body fat).   6

## 2022-06-27 ENCOUNTER — APPOINTMENT (OUTPATIENT)
Dept: ULTRASOUND IMAGING | Facility: CLINIC | Age: 42
End: 2022-06-27

## 2022-07-20 NOTE — H&P ADULT - NSHPPHYSICALEXAM_GEN_ALL_CORE
PHYSICAL EXAM:  Physical Exam:   General: sitting comftorably in bed, NAD   HEENT: neck supple, full ROM  CV: RR S1S2 no m/r/g  Lungs: CTA b/l, good air flow b/l   Back: No CVA tenderness  Abd: soft, +RLQ/suprapubic/LLQ tender to palpation without rebound/guarding, no CVA   Ext: non-tender b/l, no edema normal...

## 2022-07-21 ENCOUNTER — OUTPATIENT (OUTPATIENT)
Dept: OUTPATIENT SERVICES | Facility: HOSPITAL | Age: 42
LOS: 1 days | End: 2022-07-21
Payer: COMMERCIAL

## 2022-07-21 ENCOUNTER — APPOINTMENT (OUTPATIENT)
Dept: ULTRASOUND IMAGING | Facility: CLINIC | Age: 42
End: 2022-07-21

## 2022-07-21 DIAGNOSIS — Z00.8 ENCOUNTER FOR OTHER GENERAL EXAMINATION: ICD-10-CM

## 2022-07-21 DIAGNOSIS — Z98.890 OTHER SPECIFIED POSTPROCEDURAL STATES: Chronic | ICD-10-CM

## 2022-07-21 DIAGNOSIS — Z98.891 HISTORY OF UTERINE SCAR FROM PREVIOUS SURGERY: Chronic | ICD-10-CM

## 2022-07-21 PROCEDURE — 76830 TRANSVAGINAL US NON-OB: CPT

## 2022-07-21 PROCEDURE — 76856 US EXAM PELVIC COMPLETE: CPT

## 2022-07-21 PROCEDURE — 76856 US EXAM PELVIC COMPLETE: CPT | Mod: 26

## 2022-07-21 PROCEDURE — 76830 TRANSVAGINAL US NON-OB: CPT | Mod: 26

## 2022-12-21 ENCOUNTER — APPOINTMENT (OUTPATIENT)
Dept: ULTRASOUND IMAGING | Facility: CLINIC | Age: 42
End: 2022-12-21

## 2022-12-21 ENCOUNTER — APPOINTMENT (OUTPATIENT)
Dept: MAMMOGRAPHY | Facility: CLINIC | Age: 42
End: 2022-12-21

## 2022-12-21 PROCEDURE — 77063 BREAST TOMOSYNTHESIS BI: CPT

## 2022-12-21 PROCEDURE — 76641 ULTRASOUND BREAST COMPLETE: CPT | Mod: 50

## 2022-12-21 PROCEDURE — 77067 SCR MAMMO BI INCL CAD: CPT

## 2023-01-17 ENCOUNTER — OUTPATIENT (OUTPATIENT)
Dept: OUTPATIENT SERVICES | Facility: HOSPITAL | Age: 43
LOS: 1 days | End: 2023-01-17
Payer: COMMERCIAL

## 2023-01-17 ENCOUNTER — APPOINTMENT (OUTPATIENT)
Dept: ULTRASOUND IMAGING | Facility: CLINIC | Age: 43
End: 2023-01-17
Payer: COMMERCIAL

## 2023-01-17 DIAGNOSIS — Z98.891 HISTORY OF UTERINE SCAR FROM PREVIOUS SURGERY: Chronic | ICD-10-CM

## 2023-01-17 DIAGNOSIS — Z98.890 OTHER SPECIFIED POSTPROCEDURAL STATES: Chronic | ICD-10-CM

## 2023-01-17 DIAGNOSIS — Z00.8 ENCOUNTER FOR OTHER GENERAL EXAMINATION: ICD-10-CM

## 2023-01-17 PROCEDURE — 76641 ULTRASOUND BREAST COMPLETE: CPT

## 2023-01-17 PROCEDURE — 76641 ULTRASOUND BREAST COMPLETE: CPT | Mod: 26,RT

## 2023-06-24 ENCOUNTER — EMERGENCY (EMERGENCY)
Facility: HOSPITAL | Age: 43
LOS: 1 days | Discharge: HOME | End: 2023-06-24
Attending: EMERGENCY MEDICINE
Payer: COMMERCIAL

## 2023-06-24 VITALS
RESPIRATION RATE: 20 BRPM | SYSTOLIC BLOOD PRESSURE: 137 MMHG | HEART RATE: 96 BPM | TEMPERATURE: 98 F | WEIGHT: 175.05 LBS | OXYGEN SATURATION: 97 % | HEIGHT: 66 IN | DIASTOLIC BLOOD PRESSURE: 101 MMHG

## 2023-06-24 VITALS
SYSTOLIC BLOOD PRESSURE: 122 MMHG | RESPIRATION RATE: 20 BRPM | DIASTOLIC BLOOD PRESSURE: 63 MMHG | OXYGEN SATURATION: 99 % | HEART RATE: 61 BPM | TEMPERATURE: 98 F

## 2023-06-24 DIAGNOSIS — Z98.890 OTHER SPECIFIED POSTPROCEDURAL STATES: Chronic | ICD-10-CM

## 2023-06-24 DIAGNOSIS — Z98.891 HISTORY OF UTERINE SCAR FROM PREVIOUS SURGERY: Chronic | ICD-10-CM

## 2023-06-24 LAB
ALBUMIN SERPL ELPH-MCNC: 4.1 G/DL — SIGNIFICANT CHANGE UP (ref 3.3–5)
ALP SERPL-CCNC: 71 U/L — SIGNIFICANT CHANGE UP (ref 40–120)
ALT FLD-CCNC: 13 U/L — SIGNIFICANT CHANGE UP (ref 10–45)
ANION GAP SERPL CALC-SCNC: 11 MMOL/L — SIGNIFICANT CHANGE UP (ref 5–17)
APPEARANCE UR: CLEAR — SIGNIFICANT CHANGE UP
AST SERPL-CCNC: 15 U/L — SIGNIFICANT CHANGE UP (ref 10–40)
BACTERIA # UR AUTO: NEGATIVE — SIGNIFICANT CHANGE UP
BASE EXCESS BLDV CALC-SCNC: -0.8 MMOL/L — SIGNIFICANT CHANGE UP (ref -2–3)
BASOPHILS # BLD AUTO: 0.06 K/UL — SIGNIFICANT CHANGE UP (ref 0–0.2)
BASOPHILS NFR BLD AUTO: 0.5 % — SIGNIFICANT CHANGE UP (ref 0–2)
BILIRUB SERPL-MCNC: 0.3 MG/DL — SIGNIFICANT CHANGE UP (ref 0.2–1.2)
BILIRUB UR-MCNC: NEGATIVE — SIGNIFICANT CHANGE UP
BUN SERPL-MCNC: 13 MG/DL — SIGNIFICANT CHANGE UP (ref 7–23)
CA-I SERPL-SCNC: 1.24 MMOL/L — SIGNIFICANT CHANGE UP (ref 1.15–1.33)
CALCIUM SERPL-MCNC: 9.5 MG/DL — SIGNIFICANT CHANGE UP (ref 8.4–10.5)
CHLORIDE BLDV-SCNC: 107 MMOL/L — SIGNIFICANT CHANGE UP (ref 96–108)
CHLORIDE SERPL-SCNC: 108 MMOL/L — SIGNIFICANT CHANGE UP (ref 96–108)
CO2 BLDV-SCNC: 25 MMOL/L — SIGNIFICANT CHANGE UP (ref 22–26)
CO2 SERPL-SCNC: 21 MMOL/L — LOW (ref 22–31)
COLOR SPEC: COLORLESS — SIGNIFICANT CHANGE UP
CREAT SERPL-MCNC: 0.77 MG/DL — SIGNIFICANT CHANGE UP (ref 0.5–1.3)
DIFF PNL FLD: ABNORMAL
EGFR: 98 ML/MIN/1.73M2 — SIGNIFICANT CHANGE UP
EOSINOPHIL # BLD AUTO: 0.2 K/UL — SIGNIFICANT CHANGE UP (ref 0–0.5)
EOSINOPHIL NFR BLD AUTO: 1.7 % — SIGNIFICANT CHANGE UP (ref 0–6)
EPI CELLS # UR: 0 — SIGNIFICANT CHANGE UP
GAS PNL BLDV: 136 MMOL/L — SIGNIFICANT CHANGE UP (ref 136–145)
GAS PNL BLDV: SIGNIFICANT CHANGE UP
GAS PNL BLDV: SIGNIFICANT CHANGE UP
GLUCOSE BLDV-MCNC: 90 MG/DL — SIGNIFICANT CHANGE UP (ref 70–99)
GLUCOSE SERPL-MCNC: 88 MG/DL — SIGNIFICANT CHANGE UP (ref 70–99)
GLUCOSE UR QL: NEGATIVE — SIGNIFICANT CHANGE UP
HCO3 BLDV-SCNC: 24 MMOL/L — SIGNIFICANT CHANGE UP (ref 22–29)
HCT VFR BLD CALC: 43.1 % — SIGNIFICANT CHANGE UP (ref 34.5–45)
HCT VFR BLDA CALC: 44 % — SIGNIFICANT CHANGE UP (ref 34.5–46.5)
HGB BLD CALC-MCNC: 14.6 G/DL — SIGNIFICANT CHANGE UP (ref 11.7–16.1)
HGB BLD-MCNC: 14.2 G/DL — SIGNIFICANT CHANGE UP (ref 11.5–15.5)
HYALINE CASTS # UR AUTO: 0 /LPF — SIGNIFICANT CHANGE UP (ref 0–7)
IMM GRANULOCYTES NFR BLD AUTO: 0.7 % — SIGNIFICANT CHANGE UP (ref 0–0.9)
KETONES UR-MCNC: NEGATIVE — SIGNIFICANT CHANGE UP
LACTATE BLDV-MCNC: 1 MMOL/L — SIGNIFICANT CHANGE UP (ref 0.5–2)
LEUKOCYTE ESTERASE UR-ACNC: NEGATIVE — SIGNIFICANT CHANGE UP
LIDOCAIN IGE QN: 25 U/L — SIGNIFICANT CHANGE UP (ref 7–60)
LYMPHOCYTES # BLD AUTO: 27.1 % — SIGNIFICANT CHANGE UP (ref 13–44)
LYMPHOCYTES # BLD AUTO: 3.26 K/UL — SIGNIFICANT CHANGE UP (ref 1–3.3)
MCHC RBC-ENTMCNC: 29.3 PG — SIGNIFICANT CHANGE UP (ref 27–34)
MCHC RBC-ENTMCNC: 32.9 GM/DL — SIGNIFICANT CHANGE UP (ref 32–36)
MCV RBC AUTO: 88.9 FL — SIGNIFICANT CHANGE UP (ref 80–100)
MONOCYTES # BLD AUTO: 0.76 K/UL — SIGNIFICANT CHANGE UP (ref 0–0.9)
MONOCYTES NFR BLD AUTO: 6.3 % — SIGNIFICANT CHANGE UP (ref 2–14)
NEUTROPHILS # BLD AUTO: 7.69 K/UL — HIGH (ref 1.8–7.4)
NEUTROPHILS NFR BLD AUTO: 63.7 % — SIGNIFICANT CHANGE UP (ref 43–77)
NITRITE UR-MCNC: NEGATIVE — SIGNIFICANT CHANGE UP
NRBC # BLD: 0 /100 WBCS — SIGNIFICANT CHANGE UP (ref 0–0)
PCO2 BLDV: 37 MMHG — LOW (ref 39–42)
PH BLDV: 7.41 — SIGNIFICANT CHANGE UP (ref 7.32–7.43)
PH UR: 5.5 — SIGNIFICANT CHANGE UP (ref 5–8)
PLATELET # BLD AUTO: 331 K/UL — SIGNIFICANT CHANGE UP (ref 150–400)
PO2 BLDV: 45 MMHG — SIGNIFICANT CHANGE UP (ref 25–45)
POTASSIUM BLDV-SCNC: 4 MMOL/L — SIGNIFICANT CHANGE UP (ref 3.5–5.1)
POTASSIUM SERPL-MCNC: 4.1 MMOL/L — SIGNIFICANT CHANGE UP (ref 3.5–5.3)
POTASSIUM SERPL-SCNC: 4.1 MMOL/L — SIGNIFICANT CHANGE UP (ref 3.5–5.3)
PROT SERPL-MCNC: 6.8 G/DL — SIGNIFICANT CHANGE UP (ref 6–8.3)
PROT UR-MCNC: NEGATIVE — SIGNIFICANT CHANGE UP
RBC # BLD: 4.85 M/UL — SIGNIFICANT CHANGE UP (ref 3.8–5.2)
RBC # FLD: 13.2 % — SIGNIFICANT CHANGE UP (ref 10.3–14.5)
RBC CASTS # UR COMP ASSIST: 1 /HPF — SIGNIFICANT CHANGE UP (ref 0–4)
SAO2 % BLDV: 81.5 % — SIGNIFICANT CHANGE UP (ref 67–88)
SODIUM SERPL-SCNC: 140 MMOL/L — SIGNIFICANT CHANGE UP (ref 135–145)
SP GR SPEC: 1 — LOW (ref 1.01–1.02)
UROBILINOGEN FLD QL: NEGATIVE — SIGNIFICANT CHANGE UP
WBC # BLD: 12.05 K/UL — HIGH (ref 3.8–10.5)
WBC # FLD AUTO: 12.05 K/UL — HIGH (ref 3.8–10.5)
WBC UR QL: 0 /HPF — SIGNIFICANT CHANGE UP (ref 0–5)

## 2023-06-24 PROCEDURE — 85025 COMPLETE CBC W/AUTO DIFF WBC: CPT

## 2023-06-24 PROCEDURE — 99284 EMERGENCY DEPT VISIT MOD MDM: CPT | Mod: 25

## 2023-06-24 PROCEDURE — 99284 EMERGENCY DEPT VISIT MOD MDM: CPT

## 2023-06-24 PROCEDURE — 96375 TX/PRO/DX INJ NEW DRUG ADDON: CPT

## 2023-06-24 PROCEDURE — 76705 ECHO EXAM OF ABDOMEN: CPT

## 2023-06-24 PROCEDURE — 80053 COMPREHEN METABOLIC PANEL: CPT

## 2023-06-24 PROCEDURE — 82803 BLOOD GASES ANY COMBINATION: CPT

## 2023-06-24 PROCEDURE — 76705 ECHO EXAM OF ABDOMEN: CPT | Mod: 26

## 2023-06-24 PROCEDURE — 84295 ASSAY OF SERUM SODIUM: CPT

## 2023-06-24 PROCEDURE — 81001 URINALYSIS AUTO W/SCOPE: CPT

## 2023-06-24 PROCEDURE — 85014 HEMATOCRIT: CPT

## 2023-06-24 PROCEDURE — 84132 ASSAY OF SERUM POTASSIUM: CPT

## 2023-06-24 PROCEDURE — 82947 ASSAY GLUCOSE BLOOD QUANT: CPT

## 2023-06-24 PROCEDURE — 83690 ASSAY OF LIPASE: CPT

## 2023-06-24 PROCEDURE — 82435 ASSAY OF BLOOD CHLORIDE: CPT

## 2023-06-24 PROCEDURE — 82330 ASSAY OF CALCIUM: CPT

## 2023-06-24 PROCEDURE — 85018 HEMOGLOBIN: CPT

## 2023-06-24 PROCEDURE — 83605 ASSAY OF LACTIC ACID: CPT

## 2023-06-24 PROCEDURE — 96365 THER/PROPH/DIAG IV INF INIT: CPT

## 2023-06-24 RX ORDER — SODIUM CHLORIDE 9 MG/ML
1000 INJECTION, SOLUTION INTRAVENOUS ONCE
Refills: 0 | Status: COMPLETED | OUTPATIENT
Start: 2023-06-24 | End: 2023-06-24

## 2023-06-24 RX ORDER — ACETAMINOPHEN 500 MG
1000 TABLET ORAL ONCE
Refills: 0 | Status: COMPLETED | OUTPATIENT
Start: 2023-06-24 | End: 2023-06-24

## 2023-06-24 RX ORDER — FAMOTIDINE 10 MG/ML
20 INJECTION INTRAVENOUS ONCE
Refills: 0 | Status: COMPLETED | OUTPATIENT
Start: 2023-06-24 | End: 2023-06-24

## 2023-06-24 RX ADMIN — Medication 1000 MILLIGRAM(S): at 15:06

## 2023-06-24 RX ADMIN — FAMOTIDINE 20 MILLIGRAM(S): 10 INJECTION INTRAVENOUS at 13:36

## 2023-06-24 RX ADMIN — SODIUM CHLORIDE 1000 MILLILITER(S): 9 INJECTION, SOLUTION INTRAVENOUS at 13:36

## 2023-06-24 RX ADMIN — Medication 20 MILLIGRAM(S): at 13:36

## 2023-06-24 RX ADMIN — Medication 400 MILLIGRAM(S): at 13:36

## 2023-06-24 RX ADMIN — Medication 30 MILLILITER(S): at 13:36

## 2023-06-24 NOTE — ED PROVIDER NOTE - ATTENDING CONTRIBUTION TO CARE
[facility] Cardiology  Office Progress Note  Smith Craven  1949  [address] [place] [address]       Visit Date: 03/14/22    PCP: Jeane Baird MD  [address]   [place] [address]    IDENTIFICATION: A 72 y.o. male retired over the road   Former Karthikeyan patient    PROBLEM LIST:   1. Permanent AF with slow ventricular response  1. CHADsVasc=3- on Eliquis  2. Noted to have AF with slow ventricular response during f/u with Dr. Napier, 5/27/21.   3. MCOT, 5/27/21, 15 day, 5/27/21-6/17/21, Max  bpm, Min HR 26 bpm, Avg HR 70 bpm. AF burden 98%,  HR to 30 bpm not associated with sleep, Pauses >2 seconds occurred 21 times with longest pause 3.8 seconds. PAC burden <1%, PVC burden 1%.   (off  Metoprolol during the monitor)   2. Cardiac disease, multifactorial:              A. Non-obstructive CAD by cath, January 2008.              B. Atrial fibrillation. CV = 2, on Eliquis  C. TTE, 12/15/18: biatrial enlargement, normal LVEF, mild MR, trivial pericardial effusion, mild PA hypertension  D. RHC 1/11/19: Severe biventricular elevation in diastolic pressures and severe PAH (pre-splenectomy)  E. LAUREN 1/11/19: EF 55%, mild-mod MR, LA and RA are dilated, saline test results positive with valsalva for PFO               F. Echo, 2/18/2020: biatrial enlargement, EF 52% normal RVSP              g. Atrial fibrillation with slow VR, summer 2021              A. Leadless PPM (MDT), 8/9/2021 ([facility]).  2.    Obesity.  3.    Neurocardiogenic syncope.  4.    Cholelithiasis status post cholecystectomy, summer 2016:                          A. Procedure complicated by pneumonitis and need for blood transfusions.   5.    COPD and ANN MARIE, CPAP (stopped use in 2019)                          A. Followed by Yossi Blake MD.  6.    Chronic lower extremity venous insufficiency.  7.    Chronic leukopenia and anemia - resolved.              A. Hypersplenism              B.  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    Gilbert's syndrome (doubt, see # 7B above).   9.    \"Fatty liver with no cirrhosis or amyloid\" diagnosed by liver biopsy, Summer 2018    10.  Hospitalization, 12/2018: shortness of breath                          A.  Bilateral pleural effusions/pericardial effusion noted.                          B.  KFV=522, negative troponins                          C.  \"MASSIVE\" spleenomegaly with no important ascites                                    D.   Recent O2 for desaturation.             11.   Splenectomy, laparoscopic: hereditary elliptocytosis, 4/18/2019             12.    COVID-19 positive with outpatient BAM tx, 2/2021.      CC:   Chief Complaint   Patient presents with   • Atrial Fibrillation   • Coronary Artery Disease       Allergies  No Known Allergies    Current Medications    Current Outpatient Medications:   •  acetaminophen (TYLENOL) 500 MG tablet, Take 500 mg by mouth Every 6 (Six) Hours As Needed for Mild Pain ., Disp: , Rfl:   •  apixaban (ELIQUIS) 5 MG tablet tablet, Take 1 tablet by mouth Every 12 (Twelve) Hours. First dose Tuesday evening 8/10/21, Disp: 180 tablet, Rfl: 3  •  Ascorbic Acid (VITAMIN C PO), Take 500 mg by mouth Daily., Disp: , Rfl:   •  atorvastatin (LIPITOR) 20 MG tablet, Take 1 tablet by mouth Every Night., Disp: 90 tablet, Rfl: 3  •  Breo Ellipta 200-25 MCG/INH inhaler, INHALE 1 PUFF BY MOUTH EVERY DAY FOR BREATHING, Disp: 60 each, Rfl: 3  •  Cholecalciferol (Vitamin D3) 50 MCG (2000 UT) capsule, Take 2,000 Units by mouth Daily., Disp: , Rfl:   •  eplerenone (INSPRA) 25 MG tablet, Take 1 tablet by mouth Daily., Disp: 30 tablet, Rfl: 11  •  furosemide (LASIX) 40 MG tablet, Take 1 tablet by mouth 2 (Two) Times a Day. (Patient taking differently: Take 40 mg by mouth Daily.), Disp: 180 tablet, Rfl: 2  •  ibuprofen (ADVIL,MOTRIN) 800 MG tablet, Take 800 mg by mouth Every 8 (Eight) Hours As Needed for Mild Pain ., Disp: , Rfl:   •  losartan " "(COZAAR) 50 MG tablet, Take 1 tablet by mouth Daily., Disp: 30 tablet, Rfl: 11  •  vitamin B-12 (CYANOCOBALAMIN) 1000 MCG tablet, Take 2,500 mcg by mouth daily., Disp: , Rfl:       History of Present Illness   Smith Craven is a 72 y.o. year old male here for follow up.    Pt denies any chest pain, dyspnea, dyspnea on exertion, orthopnea, PND, palpitations, lower extremity edema, or claudication.  Enjoys caring for his grandkids and great-grandchildren.  He states 1 even sleeps with him regularly.    OBJECTIVE:  Vitals:    03/14/22 1418   BP: 128/70   BP Location: Right arm   Patient Position: Sitting   Pulse: 78   SpO2: 98%   Weight: 118 kg (260 lb)   Height: 177.8 cm (70\")     Body mass index is 37.31 kg/m².    Constitutional:       Appearance: Healthy appearance. Not in distress.   Neck:      Vascular: No JVR. JVD normal.   Pulmonary:      Effort: Pulmonary effort is normal.      Breath sounds: Normal breath sounds. No wheezing. No rhonchi. No rales.   Chest:      Chest wall: Not tender to palpatation.   Cardiovascular:      PMI at left midclavicular line. Normal rate. Regular rhythm. Normal S1. Normal S2.      Murmurs: There is no murmur.      No gallop. No click. No rub.   Pulses:     Intact distal pulses.   Edema:     Peripheral edema absent.   Abdominal:      General: Bowel sounds are normal.      Palpations: Abdomen is soft.      Tenderness: There is no abdominal tenderness.   Musculoskeletal: Normal range of motion.         General: No tenderness. Skin:     General: Skin is warm and dry.   Neurological:      General: No focal deficit present.      Mental Status: Alert and oriented to person, place and time.         Diagnostic Data:  Procedures      ASSESSMENT:   Diagnosis Plan   1. Chronic atrial fibrillation (HCC)     2. Coronary artery disease involving native coronary artery of native heart without angina pectoris     3. Primary hypertension     4. Mixed hyperlipidemia         PLAN:  Chronic A. fib post " AV node ablation pacemaker anticoagulated    CAD post remote catheterization no obstructive symptoms continued observation    Mixed dyslipidemia controlled on statin therapy    Hypertension controlled eplerenone losartan combination            Edward Stephens MD, FACC   42-year-old female with chronic abdominal pain with extensive outpatient work-up including endoscopy 1 year ago as well as outpatient CAT scans and ultrasounds at least 1 year ago presenting with acute on chronic abdominal pain, mostly to upper abdomen over the last several days.  Pain was worst last night after eating dinner.  Denies any abnormalities with bowel movements, fever, vomiting.  Patient on Prilosec at home.  Denies urinary symptoms.  Has had multiple prior gynecologic procedures including removal of left fallopian tube but no other abdominal surgeries.  Examination as above.  Will obtain labs including lipase, symptomatic treatment, IV fluids, right upper quadrant ultrasound to evaluate gallbladder, consider CT imaging if blood work abnormal and/or if patient not pain controlled and reassess

## 2023-06-24 NOTE — ED PROVIDER NOTE - CLINICAL SUMMARY MEDICAL DECISION MAKING FREE TEXT BOX
42F here for acute on chronic abdominal pain with no indication for infectious or obstructive enteral process. Low suspicion at this time for diverticulitis or bowel obstruction. Will exclude cholecystitis vs biliary colic with ultrasound. Will also obtain comprehensive metabolic panel and complete blood count plus lipase. Fluids for pain.

## 2023-06-24 NOTE — ED PROVIDER NOTE - NSFOLLOWUPINSTRUCTIONS_ED_ALL_ED_FT
You were seen in the emergency department for abdominal pain. We have evaluated you and determined that you do not require further hospital interventions.    During your stay you had the following relevant results: an ultrasound that does not show gallstones or gallbladder inflammation    Please follow up with a GASTROENTEROLOGIST to discuss the results of your stay in our department. Someone will call you to help you schedule the appointment.    You may continue to experience abdominal pain while at home. For your pain, you should take 60mg (1 tablet) of simethicone (brand name Gas-X) once a day. You may also take 1000mg (2 extra strength tablets) of acetaminophen (brand name Tylenol) every 6 hours. Do not take more than four doses in a day. You should avoid taking ibuprofen (brand names Motrin and Advil) as well as eating spicy or fatty foods as these may worsen your pain.    If you start to experience worsening symptoms such as persistent nausea or vomiting, inability to pass stool or gas, severe abdominal pain, bloody stool, please return to the emergency department for further evaluation.

## 2023-06-24 NOTE — ED ADULT TRIAGE NOTE - ESI TRIAGE ACUITY LEVEL, MLM
"Chief Complaint/ HPI: f/u with anxiety and depression  Still working   Going to [employer] fri , [facility]      Objective   Vital Signs  Vitals:    02/23/22 1446   BP: 108/73   Pulse: 85   Temp: 97.2 °F (36.2 °C)   SpO2: 98%   Weight: 87.5 kg (192 lb 12.8 oz)   Height: 170.2 cm (67.01\")      Body mass index is 30.19 kg/m².  Review of Systems   Constitutional: Negative.    HENT: Negative.    Eyes: Negative.    Respiratory: Negative.    Cardiovascular: Negative.    Gastrointestinal: Negative.    Endocrine: Negative.    Genitourinary: Negative.    Musculoskeletal: Negative.    Allergic/Immunologic: Negative.    Neurological: Negative.    Hematological: Negative.    Psychiatric/Behavioral: Negative.       Physical Exam  Constitutional:       General: He is not in acute distress.     Appearance: Normal appearance.   HENT:      Head: Normocephalic.      Mouth/Throat:      Mouth: Mucous membranes are moist.   Eyes:      Conjunctiva/sclera: Conjunctivae normal.      Pupils: Pupils are equal, round, and reactive to light.   Cardiovascular:      Rate and Rhythm: Normal rate and regular rhythm.      Pulses: Normal pulses.      Heart sounds: Normal heart sounds.   Pulmonary:      Effort: Pulmonary effort is normal.      Breath sounds: Normal breath sounds.   Abdominal:      General: Abdomen is flat. Bowel sounds are normal.      Palpations: Abdomen is soft.   Musculoskeletal:         General: No swelling. Normal range of motion.      Cervical back: Neck supple.   Skin:     General: Skin is warm and dry.      Coloration: Skin is not jaundiced.   Neurological:      General: No focal deficit present.      Mental Status: He is alert and oriented to person, place, and time. Mental status is at baseline.   Psychiatric:         Mood and Affect: Mood normal.         Behavior: Behavior normal.         Thought Content: Thought content normal.         Judgment: Judgment normal.        Result Review :   No results found for: PROBNP, BNP  CMP    CMP " 6/24/21 8/10/21 2/21/22 2/21/22      1151 1225   Glucose 129 (A) 107 (A) 87 85   BUN 16 14 20 17   Creatinine 1.34 (A) 1.19 1.15 1.14   eGFR Non  Am 58 (A) 67 69 70   Sodium 140 139 143 142   Potassium 3.7 4.2 4.3 4.1   Chloride 105 104 106 104   Calcium 9.5 9.7 10.0 10.0   Albumin 4.40 4.40 4.50 5.50 (A)   Total Bilirubin 0.3 0.7 0.4 0.4   Alkaline Phosphatase 83 84 81 81   AST (SGOT) 81 (A) 58 (A) 29 29   ALT (SGPT) 35 35 19 21   (A) Abnormal value       Comments are available for some flowsheets but are not being displayed.           CBC w/diff    CBC w/Diff 6/24/21 2/21/22 2/21/22     1151 1225   WBC 8.18 8.36 8.62   RBC 4.77 4.75 4.79   Hemoglobin 14.9 15.1 15.3   Hematocrit 45.8 44.2 44.7   MCV 96.0 93.1 93.3   MCH 31.2 31.8 31.9   MCHC 32.5 34.2 34.2   RDW 12.2 (A) 11.8 (A) 11.9 (A)   Platelets 218 244 248   Neutrophil Rel % 66.0 61.6 62.3   Immature Granulocyte Rel % 1.0 (A) 0.6 (A) 0.3   Lymphocyte Rel % 22.9 27.2 27.1   Monocyte Rel % 6.1 7.4 7.1   Eosinophil Rel % 3.3 2.6 2.6   Basophil Rel % 0.7 0.6 0.6   (A) Abnormal value             Lipid Panel    Lipid Panel 8/10/21 2/21/22   Total Cholesterol 131 130   Triglycerides 259 (A) 157 (A)   HDL Cholesterol 28 (A) 27 (A)   VLDL Cholesterol 42 (A) 27   LDL Cholesterol  61 76   LDL/HDL Ratio 1.83 2.65   (A) Abnormal value             Lab Results   Component Value Date    TSH 1.500 02/21/2022    TSH 1.550 05/22/2019      No results found for: FREET4   A1C Last 3 Results    HGBA1C Last 3 Results 2/21/22   Hemoglobin A1C 5.60            PSA    PSA 8/10/21 2/21/22   PSA 1.450 1.030                          Visit Diagnoses:    ICD-10-CM ICD-9-CM   1. ADHD, adult residual type  F90.8 314.01   2. Impaired fasting glucose  R73.01 790.21   3. Mixed hyperlipidemia  E78.2 272.2   4. Refractory migraine without aura  G43.019 346.11   5. Gastric reflux  K21.9 530.81   6. Bipolar affective disorder, currently depressed, moderate (HCC)  F31.32 296.52   7. Anxiety   F41.9 300.00   8. Elevated LFTs  R79.89 790.6   9. Essential hypertension  I10 401.9       Assessment and Plan   Diagnoses and all orders for this visit:    1. ADHD, adult residual type (Primary)  -     Comprehensive Metabolic Panel; Future  -     CBC & Differential; Future  -     Lipid Panel; Future    2. Impaired fasting glucose  -     Comprehensive Metabolic Panel; Future  -     CBC & Differential; Future  -     Lipid Panel; Future    3. Mixed hyperlipidemia  -     Comprehensive Metabolic Panel; Future  -     CBC & Differential; Future  -     Lipid Panel; Future    4. Refractory migraine without aura  -     Comprehensive Metabolic Panel; Future  -     CBC & Differential; Future  -     Lipid Panel; Future    5. Gastric reflux  -     Comprehensive Metabolic Panel; Future  -     CBC & Differential; Future  -     Lipid Panel; Future    6. Bipolar affective disorder, currently depressed, moderate (HCC)  -     Comprehensive Metabolic Panel; Future  -     CBC & Differential; Future  -     Lipid Panel; Future    7. Anxiety  -     Comprehensive Metabolic Panel; Future  -     CBC & Differential; Future  -     Lipid Panel; Future    8. Elevated LFTs  -     Comprehensive Metabolic Panel; Future  -     CBC & Differential; Future  -     Lipid Panel; Future    9. Essential hypertension  -     Comprehensive Metabolic Panel; Future  -     CBC & Differential; Future  -     Lipid Panel; Future        CERVICAL DYSTONIA, OA, status post epidural injections twice, no significant improvement,---PER PAIN MGT SEPT 2020, EPIDURALS --not seen now    ADHD, cont ON ADDERALL XR 30 MG QAM -- and adderall 5 mg q 1pm    -- PYSCHIATRY AND CHANGED TO STRATERRA 100 MG QD     depression-- cont Lamictal 200MG BID , viIBRYD 40 mg daily, BUSPAR 15 MG tid  --? SEEING PYSCH. AND PYSCHOLOGIST,     Memory issues previous visit February 2020 MRI of the brain unremarkable, improved off Lyrica,    Weight gain, overweight, improved off Lyrica with dramatic  weight loss as of February 2022     diagnosis father with colon cancer age 60, January 2019,, C scope performed February 2020 with Dr. Velez some tubular adenomas found    anxiety is stable--Inderal LA 60 mg daily as of November 2021,    sleeping better, ----continues takes ativan 1 MG at night PRN     gerd stable--- continues Nexium QD     ast/alt elevated 88/105 respectively Jan 2019, Up to 130, 104 respectively May 2019,-FATTY LIVER DUE TO WGT ISSUES, DISCUSSED WGT LOSS, EXERCISE -, Workup again including ultrasound liver shows steatosis, otherwise unremarkable May 2019 hepatitis, iron, ceruloplasmin, anti-smooth muscle panels all negative May 2019, Improved October 2019--- LFTs improved, August 2021,,----------- normal February 2022 with weight loss,    iMPAIRED FASTING GLUCOSE-- hemoglobin A1c,Hemoglobin A1c 5.6--feb 2022     Decreased libido,     Markedly elevated cholesterol and triglycerides , continues Crestor 10 mg, fenofibrate 160 mg daily     Migraine headaches --- cont  Imitrex 100 MG QD PRN -- Pills and injections, qulipta daily , flexeril prn , zofran prn ,             Follow Up   Return in about 6 months (around 8/23/2022).  Patient was given instructions and counseling regarding his condition or for health maintenance advice. Please see specific information pulled into the AVS if appropriate.          2

## 2023-06-24 NOTE — ED PROVIDER NOTE - PATIENT PORTAL LINK FT
You can access the FollowMyHealth Patient Portal offered by [facility] by registering at the following website: [email or web address]. By joining [employer]’s FollowMyHealth portal, you will also be able to view your health information using other applications (apps) compatible with our system.

## 2023-06-24 NOTE — ED PROVIDER NOTE - PROGRESS NOTE DETAILS
Sudheer Lipscomb MD: Patient's labs and/or imaging have been reviewed. No acute interventions indicated at this time. She will be discharged with referral to gastroenterology and start simethicone.

## 2023-06-24 NOTE — ED ADULT NURSE NOTE - OBJECTIVE STATEMENT
Pt 42 year old female, A/O x4. Pt came in due to abdominal pain. No PMH. As per pt, she has had epigastric pain x 2 years with no resolution and seen multiple gastroenterologists. X 2 days, pain descended to generalized abd. an dpt complaining of abdominal distention and difficulty breathing due to pressure of abdomen. Upon assessment, pt rubbing abd, speaking in full complete sentences. Skin- warm, dry, intact. Denies chest pain, fever, chills, n/v/d, dysuria, hematuria, melena, ha, constipation, numbness/ tingling.

## 2023-06-24 NOTE — ED ADULT NURSE NOTE - NSFALLUNIVINTERV_ED_ALL_ED
Bed/Stretcher in lowest position, wheels locked, appropriate side rails in place/Call bell, personal items and telephone in reach/Instruct patient to call for assistance before getting out of bed/chair/stretcher/Non-slip footwear applied when patient is off stretcher/[place] to call system/Physically safe environment - no spills, clutter or unnecessary equipment/Purposeful proactive rounding/Room/bathroom lighting operational, light cord in reach

## 2023-06-24 NOTE — ED PROVIDER NOTE - PHYSICAL EXAMINATION
General: alert, oriented to person, time, place  Psych: mood appropriate  Head: normocephalic; atraumatic  Eyes: conjunctivae clear bilaterally, sclerae anicteric  ENT: no nasal flaring, patent nares  Cardio: RRR, no m/r/g, pulses 2+ b/l  Resp: CATB, no w/r/r  GI: soft/nondistended/nontender  Neuro: normal sensation, moving all four extremities equally  Skin: No evidence of rash or bruising  MSK: normal movement of all extremities  Lymph/Vasc: no LE edema

## 2023-06-24 NOTE — ED PROVIDER NOTE - OBJECTIVE STATEMENT
42F with past medical history of chronic abdominal pain of unclear etiology for the past two years with multiple ED and gastroenterology visits presents with acute on chronic abdominal pain for the past several days. She endorses regular nonbloody bowel movements and continues to tolerate PO intake. She has been taking Prilosec OTC with only some relief. She denies any fevers or chills.

## 2024-03-13 NOTE — PROVIDER CONTACT NOTE (CRITICAL VALUE NOTIFICATION) - NS PROVIDER READ BACK
RAPID RESPONSE NOTE      SUBJECTIVE:  Patient is an 81-year-old female for which RRT is called for hypotension to which I responded to overhead announcement promptly.    Briefly, patient presented to her PCPs office this morning for follow-up after undergoing cystoscopy last week for nephrolithiasis and was noted to have low blood pressures in office (56/34, ) who was then directed to present to the emergency room.  In the ED, UA infectious appearing, WBC 7.5, lactic acid 2.3, creatinine 1.29 (near baseline), and lactic acid 2.3 which later cleared with the administration of 500 cc bolus.  She was transferred to Cath Lab holding for admission.  Additional fluid reportedly held out of concern of her CHF history.    After transfer, patient noted to have persistently low blood pressures 80s over 50s and RRT was subsequently called.    On arrival, patient in no acute distress, mentating well, denying any lightheadedness or dizziness.  Initial physical exam with irregular rhythm, clear breath sounds, no abdominal tenderness, no CVA tenderness, no suprapubic fullness or tenderness, moving all 4 extremities spontaneously.    Patient placed in Trendelenburg, additional boluses of LR given (total of 1.25L; >30cc/kg) with subsequent improvement in blood pressure, blood cultures collected, and ceftriaxone administered.      Current Facility-Administered Medications   Medication Dose Route Frequency Provider Last Rate Last Admin    [START ON 3/13/2024] cefTRIAXone (ROCEPHIN) syringe 1,000 mg  1,000 mg Intravenous Daily Lloyd Flores   1,000 mg at 03/12/24 1930    DULoxetine (CYMBALTA) capsule 20 mg  20 mg Oral QHS Rickey Morton MD        sodium chloride 0.9 % flush bag 25 mL  25 mL Intravenous PRN Rickey Morton MD        sodium chloride 0.9 % injection 2 mL  2 mL Intracatheter 2 times per day Rickey Morton MD        Magnesium Standard Replacement Protocol   Does not apply See Admin Instructions Selene  Rickey LEYVA MD        Phosphorus Standard Replacement Protocol   Does not apply See Admin Instructions Rickey Morton MD        ondansetron (ZOFRAN) injection 4 mg  4 mg Intravenous BID PRN Rickey Morton MD        prochlorperazine (COMPAZINE) injection 5 mg  5 mg Intravenous Q4H PRN Rickey Morton MD        acetaminophen (TYLENOL) tablet 650 mg  650 mg Oral Q4H PRN Rickey Morton MD        polyethylene glycol (MIRALAX) packet 17 g  17 g Oral Daily PRN Rickey Morton MD        bisacodyl (DULCOLAX) suppository 10 mg  10 mg Rectal Daily PRN Rickey Morton MD        aluminum-magnesium hydroxide-simethicone (MAALOX) 200-200-20 MG/5ML suspension 30 mL  30 mL Oral Q4H PRN Rickey Morton MD        sodium chloride 0.9 % flush bag 25 mL  25 mL Intravenous PRN Rickey Morton MD        sodium chloride (NORMAL SALINE) 0.9 % bolus 500 mL  500 mL Intravenous PRN Rickey Morton  mL/hr at 03/12/24 1840 500 mL at 03/12/24 1840    Potassium Standard Replacement Protocol (Levels 3.5 and lower)   Does not apply See Admin Instructions Rickey Morton MD        Potassium Replacement (Levels 3.6 - 4)   Does not apply See Admin Instructions Rickey Morton MD        sodium chloride (NORMAL SALINE) 0.9 % bolus 250 mL  250 mL Intravenous Once Lloyd Flores 999 mL/hr at 03/12/24 2028 250 mL at 03/12/24 2028        OBJECTIVE:  Vital Last Value 24 Hour Range   Temperature 96.3 °F (35.7 °C) (03/12/24 1957) Temp  Min: 96.3 °F (35.7 °C)  Max: 97.5 °F (36.4 °C)   Pulse 91 (03/12/24 1957) Pulse  Min: 80  Max: 107   Respiratory 20 (03/12/24 1957) Resp  Min: 13  Max: 20   Non-Invasive  Blood Pressure 102/62 (03/12/24 1957) BP  Min: 56/34  Max: 110/65   Pulse Oximetry 97 % (03/12/24 1957) SpO2  Min: 96 %  Max: 100 %   Arterial   Blood Pressure   No data recorded      Physical Exam  Vitals reviewed.   Constitutional:       General: She is not in acute distress.     Appearance: Normal appearance. She is not  ill-appearing.   HENT:      Head: Normocephalic and atraumatic.      Neck: Neck supple. No tenderness.   Eyes:      Extraocular Movements: Extraocular movements intact.      Conjunctiva/sclera: Conjunctivae normal.   Cardiovascular:      Rate and Rhythm: Normal rate. Rhythm irregular.      Heart sounds: Normal heart sounds. No murmur heard.     No friction rub. No gallop.   Pulmonary:      Breath sounds: Normal breath sounds. No wheezing, rhonchi or rales.   Abdominal:      General: Bowel sounds are normal. There is no distension.      Palpations: Abdomen is soft.      Tenderness: There is no abdominal tenderness. There is no guarding or rebound.   Musculoskeletal:      Right lower leg: No edema.      Left lower leg: No edema.   Lymphadenopathy:      Cervical: No cervical adenopathy.   Skin:     General: Skin is warm and dry.   Neurological:      General: No focal deficit present.      Mental Status: She is alert.   Psychiatric:         Mood and Affect: Mood normal.         Behavior: Behavior normal.          ASSESSMENT AND PLAN:  Luly is a 81 year old female presenting with hypotension.      Hypotension in the setting of sepsis; suspicious for urologic etiology  Hx of 1 cm right ureteral stone s/p Lithotripsy and right ureteral stent insertion 3/6/2024  Stress-induced cardiomyopathy  EF 74%, no diastolic dysfunction, MitraClip in place with trivial regurgitation  Paroxysmal A-fib  CKD 3      -S/p 1.25L LR bolus (>30 cc/kg), cultures pending, ceftriaxone administered  -CT abdomen pelvis kidney stone protocol to evaluate for obstruction given recent ureteral manipulation, history of stones, etc.  -Discussed with admitting attending via phone; primary team will resume remainder of workup in the morning  -Discussed with patient and family member at bedside       Lloyd Flores DO  Internal Medicine, PGY3  MOD           yes

## 2024-03-21 NOTE — ED PROVIDER NOTE - NS_EDPROVIDERDISPOUSERTYPE_ED_A_ED
Dr. Pablo    Discharged 3/20  
Per message line the following information was left by Anny TOMLIN.    Procedure:   3/15/24 with Dr. Chris Pablo  Minimally invasive tricuspid valve repair performed through a right thoracotomy approach (#36 mm Elizondo Physio tricuspid annuloplasty ring and 24 mm Chord-X NeoChord system).     Cardiologist: Monisha   EF: 60%  Hemoglobin A1c:       Hemoglobin A1C (%)   Date Value   03/08/2024 5.2      Preoperative Creatinine:       Creatinine (mg/dL)   Date Value   03/08/2024 1.30 (H)         Assessment/Plan:  Severe tricuspid regurgitation/ruptured chordae tendineae present on admission S/P Right mini thoracotomy tricuspid valve repair 36 PhysioII ring:  -ASA/BB  -No Coumadin per MD  CAD S/P PCI DI (1/19/24): On PTA Plavix   Chronic HFpEF/pulmonary hypertension: GDMT as able.   Hypertension: Controlled on BB.   GRACE: CPAP @ NOC.   Tiny right apical pneumothorax/right hemidiaphragm elevation: Stable and asymptomatic on RA  Bradycardia: SB with rates in 50s @ baseline. Tolerating low dose BB.    CKD III/hypervolemia: BID Lasix/PTA Aldactone. Creat stable. Weight trending down.   Liver disease/hx ascites: LFTs stable.   PT/OT     Discharge home today        Pathways:  Wires Out: Yes  Ambulating: Yes  Coumadin: No per MD       Discussed with or notes reviewed:  Patient, RN, and MD     Discharge Disposition: Home lives with wife   
Attending Attestation (For Attendings USE Only)...

## 2024-05-06 ENCOUNTER — APPOINTMENT (OUTPATIENT)
Dept: OBGYN | Facility: CLINIC | Age: 44
End: 2024-05-06
Payer: COMMERCIAL

## 2024-05-06 PROCEDURE — 96127 BRIEF EMOTIONAL/BEHAV ASSMT: CPT

## 2024-05-06 PROCEDURE — 99459 PELVIC EXAMINATION: CPT

## 2024-05-06 PROCEDURE — 99396 PREV VISIT EST AGE 40-64: CPT

## 2024-07-31 NOTE — DISCHARGE NOTE PROVIDER - CARE PROVIDER_API CALL
[Luis Andrade)  Critical Care Medicine; Internal Medicine; Pulmonary Disease  [address] [place] [address]  Phone: [phone]  Fax: [phone]  Follow Up Time: 1 week    Kenia Arita)  Internal Medicine  [address] [place] [address]  Phone: [phone]  Fax: [phone]  Follow Up Time: 1 month

## 2024-08-02 ENCOUNTER — APPOINTMENT (OUTPATIENT)
Dept: ULTRASOUND IMAGING | Facility: CLINIC | Age: 44
End: 2024-08-02
Payer: COMMERCIAL

## 2024-08-02 ENCOUNTER — APPOINTMENT (OUTPATIENT)
Dept: MAMMOGRAPHY | Facility: CLINIC | Age: 44
End: 2024-08-02
Payer: COMMERCIAL

## 2024-08-02 PROCEDURE — 76641 ULTRASOUND BREAST COMPLETE: CPT | Mod: 50

## 2024-08-02 PROCEDURE — 77067 SCR MAMMO BI INCL CAD: CPT

## 2024-08-02 PROCEDURE — 77063 BREAST TOMOSYNTHESIS BI: CPT

## 2024-11-10 NOTE — H&P PST ADULT - PROBLEM SELECTOR PROBLEM 1
You were seen in the Emergency Department for asthma exacerbation most likely secondary to viral illness.     1) Advance activity as tolerated.   2) New prescription sent to pharmacy indicated in interview take prescription as prescribed. Continue all previously prescribed medications as directed.    3) Follow up with your primary care physician in 24-48 hours - take copies of your results.    4) Return to the Emergency Department for worsening or persistent symptoms, and/or ANY NEW OR CONCERNING SYMPTOMS.     Asthma    Asthma is a condition in which the airways tighten and narrow, making it difficult to breath. Asthma episodes, also called asthma attacks, range from minor to life-threatening. Symptoms include wheezing, coughing, chest tightness, or shortness of breath. The diagnosis of asthma is made by a review of your medical history and a physical exam, but may involve additional testing. Asthma cannot be cured, but medicines and lifestyle changes can help control it. Avoid triggers of asthma which may include animal dander, pollen, mold, smoke, air pollutants, etc.     SEEK IMMEDIATE MEDICAL CARE IF YOU HAVE ANY OF THE FOLLOWING SYMPTOMS: worsening of symptoms, shortness of breath at rest, chest pain, bluish discoloration to lips or fingertips, lightheadedness/dizziness, or fever. Excessive and frequent menstruation with regular cycle

## 2025-01-11 ENCOUNTER — EMERGENCY (EMERGENCY)
Facility: HOSPITAL | Age: 45
LOS: 1 days | Discharge: HOME | End: 2025-01-11
Attending: EMERGENCY MEDICINE
Payer: COMMERCIAL

## 2025-01-11 VITALS
TEMPERATURE: 97 F | SYSTOLIC BLOOD PRESSURE: 198 MMHG | OXYGEN SATURATION: 100 % | RESPIRATION RATE: 20 BRPM | HEART RATE: 106 BPM | DIASTOLIC BLOOD PRESSURE: 117 MMHG

## 2025-01-11 DIAGNOSIS — Z98.891 HISTORY OF UTERINE SCAR FROM PREVIOUS SURGERY: Chronic | ICD-10-CM

## 2025-01-11 DIAGNOSIS — Z98.890 OTHER SPECIFIED POSTPROCEDURAL STATES: Chronic | ICD-10-CM

## 2025-01-11 DIAGNOSIS — F32.9 MAJOR DEPRESSIVE DISORDER, SINGLE EPISODE, UNSPECIFIED: ICD-10-CM

## 2025-01-11 LAB
ALBUMIN SERPL ELPH-MCNC: 4.3 G/DL — SIGNIFICANT CHANGE UP (ref 3.3–5)
ALP SERPL-CCNC: 63 U/L — SIGNIFICANT CHANGE UP (ref 40–120)
ALT FLD-CCNC: 12 U/L — SIGNIFICANT CHANGE UP (ref 10–45)
AMPHET UR-MCNC: NEGATIVE — SIGNIFICANT CHANGE UP
ANION GAP SERPL CALC-SCNC: 13 MMOL/L — SIGNIFICANT CHANGE UP (ref 5–17)
APAP SERPL-MCNC: <15 UG/ML — SIGNIFICANT CHANGE UP (ref 10–30)
APPEARANCE UR: CLEAR — SIGNIFICANT CHANGE UP
AST SERPL-CCNC: 15 U/L — SIGNIFICANT CHANGE UP (ref 10–40)
BACTERIA # UR AUTO: ABNORMAL /HPF
BARBITURATES UR SCN-MCNC: NEGATIVE — SIGNIFICANT CHANGE UP
BASOPHILS # BLD AUTO: 0 K/UL — SIGNIFICANT CHANGE UP (ref 0–0.2)
BASOPHILS NFR BLD AUTO: 0 % — SIGNIFICANT CHANGE UP (ref 0–2)
BENZODIAZ UR-MCNC: NEGATIVE — SIGNIFICANT CHANGE UP
BILIRUB SERPL-MCNC: 0.2 MG/DL — SIGNIFICANT CHANGE UP (ref 0.2–1.2)
BILIRUB UR-MCNC: NEGATIVE — SIGNIFICANT CHANGE UP
BUN SERPL-MCNC: 12 MG/DL — SIGNIFICANT CHANGE UP (ref 7–23)
CALCIUM SERPL-MCNC: 9.9 MG/DL — SIGNIFICANT CHANGE UP (ref 8.4–10.5)
CAST: 1 /LPF — SIGNIFICANT CHANGE UP (ref 0–4)
CHLORIDE SERPL-SCNC: 105 MMOL/L — SIGNIFICANT CHANGE UP (ref 96–108)
CO2 SERPL-SCNC: 21 MMOL/L — LOW (ref 22–31)
COCAINE METAB.OTHER UR-MCNC: NEGATIVE — SIGNIFICANT CHANGE UP
COLOR SPEC: YELLOW — SIGNIFICANT CHANGE UP
CREAT SERPL-MCNC: 0.85 MG/DL — SIGNIFICANT CHANGE UP (ref 0.5–1.3)
DIFF PNL FLD: ABNORMAL
EGFR: 86 ML/MIN/1.73M2 — SIGNIFICANT CHANGE UP
EOSINOPHIL # BLD AUTO: 0.14 K/UL — SIGNIFICANT CHANGE UP (ref 0–0.5)
EOSINOPHIL NFR BLD AUTO: 0.9 % — SIGNIFICANT CHANGE UP (ref 0–6)
ETHANOL SERPL-MCNC: <10 MG/DL — SIGNIFICANT CHANGE UP (ref 0–10)
GLUCOSE SERPL-MCNC: 97 MG/DL — SIGNIFICANT CHANGE UP (ref 70–99)
GLUCOSE UR QL: NEGATIVE MG/DL — SIGNIFICANT CHANGE UP
HCG UR QL: NEGATIVE — SIGNIFICANT CHANGE UP
HCT VFR BLD CALC: 43.1 % — SIGNIFICANT CHANGE UP (ref 34.5–45)
HGB BLD-MCNC: 14.4 G/DL — SIGNIFICANT CHANGE UP (ref 11.5–15.5)
KETONES UR-MCNC: NEGATIVE MG/DL — SIGNIFICANT CHANGE UP
LEUKOCYTE ESTERASE UR-ACNC: NEGATIVE — SIGNIFICANT CHANGE UP
LYMPHOCYTES # BLD AUTO: 26.6 % — SIGNIFICANT CHANGE UP (ref 13–44)
LYMPHOCYTES # BLD AUTO: 4.15 K/UL — HIGH (ref 1–3.3)
MAGNESIUM SERPL-MCNC: 2.2 MG/DL — SIGNIFICANT CHANGE UP (ref 1.6–2.6)
MANUAL SMEAR VERIFICATION: SIGNIFICANT CHANGE UP
MCHC RBC-ENTMCNC: 30.5 PG — SIGNIFICANT CHANGE UP (ref 27–34)
MCHC RBC-ENTMCNC: 33.4 G/DL — SIGNIFICANT CHANGE UP (ref 32–36)
MCV RBC AUTO: 91.3 FL — SIGNIFICANT CHANGE UP (ref 80–100)
METHADONE UR-MCNC: NEGATIVE — SIGNIFICANT CHANGE UP
MONOCYTES # BLD AUTO: 0.55 K/UL — SIGNIFICANT CHANGE UP (ref 0–0.9)
MONOCYTES NFR BLD AUTO: 3.5 % — SIGNIFICANT CHANGE UP (ref 2–14)
NEUTROPHILS # BLD AUTO: 10.07 K/UL — HIGH (ref 1.8–7.4)
NEUTROPHILS NFR BLD AUTO: 64.6 % — SIGNIFICANT CHANGE UP (ref 43–77)
NITRITE UR-MCNC: NEGATIVE — SIGNIFICANT CHANGE UP
OPIATES UR-MCNC: NEGATIVE — SIGNIFICANT CHANGE UP
OXYCODONE UR-MCNC: NEGATIVE — SIGNIFICANT CHANGE UP
PCP SPEC-MCNC: SIGNIFICANT CHANGE UP
PCP UR-MCNC: NEGATIVE — SIGNIFICANT CHANGE UP
PH UR: 5.5 — SIGNIFICANT CHANGE UP (ref 5–8)
PHOSPHATE SERPL-MCNC: 3 MG/DL — SIGNIFICANT CHANGE UP (ref 2.5–4.5)
PLAT MORPH BLD: NORMAL — SIGNIFICANT CHANGE UP
PLATELET # BLD AUTO: 360 K/UL — SIGNIFICANT CHANGE UP (ref 150–400)
POTASSIUM SERPL-MCNC: 4.4 MMOL/L — SIGNIFICANT CHANGE UP (ref 3.5–5.3)
POTASSIUM SERPL-SCNC: 4.4 MMOL/L — SIGNIFICANT CHANGE UP (ref 3.5–5.3)
PROT SERPL-MCNC: 7.4 G/DL — SIGNIFICANT CHANGE UP (ref 6–8.3)
PROT UR-MCNC: NEGATIVE MG/DL — SIGNIFICANT CHANGE UP
RBC # BLD: 4.72 M/UL — SIGNIFICANT CHANGE UP (ref 3.8–5.2)
RBC # FLD: 12.7 % — SIGNIFICANT CHANGE UP (ref 10.3–14.5)
RBC BLD AUTO: SIGNIFICANT CHANGE UP
RBC CASTS # UR COMP ASSIST: 4 /HPF — SIGNIFICANT CHANGE UP (ref 0–4)
REVIEW: SIGNIFICANT CHANGE UP
SALICYLATES SERPL-MCNC: <2 MG/DL — LOW (ref 15–30)
SODIUM SERPL-SCNC: 139 MMOL/L — SIGNIFICANT CHANGE UP (ref 135–145)
SP GR SPEC: 1 — SIGNIFICANT CHANGE UP (ref 1–1.03)
SQUAMOUS # UR AUTO: 3 /HPF — SIGNIFICANT CHANGE UP (ref 0–5)
THC UR QL: NEGATIVE — SIGNIFICANT CHANGE UP
UROBILINOGEN FLD QL: 0.2 MG/DL — SIGNIFICANT CHANGE UP (ref 0.2–1)
VARIANT LYMPHS # BLD: 4.4 % — SIGNIFICANT CHANGE UP (ref 0–6)
WBC # BLD: 15.59 K/UL — HIGH (ref 3.8–10.5)
WBC # FLD AUTO: 15.59 K/UL — HIGH (ref 3.8–10.5)
WBC UR QL: 1 /HPF — SIGNIFICANT CHANGE UP (ref 0–5)

## 2025-01-11 PROCEDURE — 83735 ASSAY OF MAGNESIUM: CPT

## 2025-01-11 PROCEDURE — 93005 ELECTROCARDIOGRAM TRACING: CPT

## 2025-01-11 PROCEDURE — 99285 EMERGENCY DEPT VISIT HI MDM: CPT

## 2025-01-11 PROCEDURE — 84100 ASSAY OF PHOSPHORUS: CPT

## 2025-01-11 PROCEDURE — 85025 COMPLETE CBC W/AUTO DIFF WBC: CPT

## 2025-01-11 PROCEDURE — 99285 EMERGENCY DEPT VISIT HI MDM: CPT | Mod: 25

## 2025-01-11 PROCEDURE — 80053 COMPREHEN METABOLIC PANEL: CPT

## 2025-01-11 PROCEDURE — 81025 URINE PREGNANCY TEST: CPT

## 2025-01-11 PROCEDURE — 80307 DRUG TEST PRSMV CHEM ANLYZR: CPT

## 2025-01-11 PROCEDURE — 81001 URINALYSIS AUTO W/SCOPE: CPT

## 2025-01-11 NOTE — ED PROVIDER NOTE - DATE/TIME 3
12-Jan-2025 05:04 Skin Substitute Text: The defect edges were debeveled with a #15 scalpel blade.  Given the location of the defect, shape of the defect and the proximity to free margins a skin substitute graft was deemed most appropriate.  The graft material was trimmed to fit the size of the defect. The graft was then placed in the primary defect and oriented appropriately.

## 2025-01-11 NOTE — ED BEHAVIORAL HEALTH ASSESSMENT NOTE - DETAILS
Patient with traumatic birth including hemorrhage, cardiac arrest and death of 7 week old infant in 2016. closed case - investigated after Eugene lucas one son  at age 7 weeks old denies SI self referred to be provided not suicidal

## 2025-01-11 NOTE — ED BEHAVIORAL HEALTH ASSESSMENT NOTE - NSBHMSEJUDGE_PSY_A_CORE
Albuterol (Eqv-ProAir HFA) 90 mcg/inh inhalation aerosol: 2 puff(s) inhaled every 6 hours as needed for  shortness of breath and/or wheezing  amLODIPine: 7.5 milligram(s) orally once a day  Aspirin Enteric Coated 81 mg oral delayed release tablet: 1 tab(s) orally once a day  atorvastatin 40 mg oral tablet: 1 tab(s) orally once a day  ezetimibe 10 mg oral tablet: 1 tab(s) orally once a day  Farxiga 5 mg oral tablet: 1 tab(s) orally once a day  hydroCHLOROthiazide 25 mg oral tablet: 1 tab(s) orally once a day  losartan 100 mg oral tablet: 1 tab(s) orally once a day  silodosin 8 mg oral capsule: 1 cap(s) orally once a day  Singulair 10 mg oral tablet: 1 tab(s) orally once a day  Wixela Inhub 100 mcg-50 mcg inhalation powder: 1 puff(s) inhaled 2 times a day   Albuterol (Eqv-ProAir HFA) 90 mcg/inh inhalation aerosol: 2 puff(s) inhaled every 6 hours as needed for  shortness of breath and/or wheezing  amLODIPine: 7.5 milligram(s) orally once a day  Aspirin Enteric Coated 81 mg oral delayed release tablet: 1 tab(s) orally once a day  atorvastatin 40 mg oral tablet: 1 tab(s) orally once a day  clopidogrel 75 mg oral tablet: 1 tab(s) orally once a day  ezetimibe 10 mg oral tablet: 1 tab(s) orally once a day  Farxiga 5 mg oral tablet: 1 tab(s) orally once a day  hydroCHLOROthiazide 25 mg oral tablet: 1 tab(s) orally once a day  losartan 100 mg oral tablet: 1 tab(s) orally once a day  silodosin 8 mg oral capsule: 1 cap(s) orally once a day  Singulair 10 mg oral tablet: 1 tab(s) orally once a day  Wixela Inhub 100 mcg-50 mcg inhalation powder: 1 puff(s) inhaled 2 times a day   Good

## 2025-01-11 NOTE — ED PROVIDER NOTE - PATIENT PORTAL LINK FT
You can access the FollowMyHealth Patient Portal offered by [facility] by registering at the following website: [email or web address]. By joining [facility]’s FollowMyHealth portal, you will also be able to view your health information using other applications (apps) compatible with our system.

## 2025-01-11 NOTE — ED PROVIDER NOTE - PHYSICAL EXAMINATION
GENERAL: well appearing in no acute distress, non-toxic appearing  CARDIAC: regular rate and rhythm, normal S1S2, no appreciable murmurs, 2+ pulses in UE/LE b/l  PULM: normal breath sounds, clear to ascultation bilaterally, no rales, rhonchi, wheezing  GI: abdomen nondistended, soft, nontender, no guarding, rebound tenderness  : no CVA tenderness b/l, no suprapubic tenderness  MSK: no peripheral edema, no calf tenderness b/l  SKIN: well-perfused, extremities warm, no visible rashes

## 2025-01-11 NOTE — ED BEHAVIORAL HEALTH ASSESSMENT NOTE - HPI (INCLUDE ILLNESS QUALITY, SEVERITY, DURATION, TIMING, CONTEXT, MODIFYING FACTORS, ASSOCIATED SIGNS AND SYMPTOMS)
Patient is a 44 year-old female, going through a divorce, employed, pmh lumbar herniated disc, pph PTSD, anxiety, no hx of suicide attempt, no hx of inpatient psych admissions, who presents to ED with suicidal thoughts and PTSD sx,    Patient presents alert and oriented to person, place, time and situation. Patient is a 44 year-old female, going through a divorce, employed, no pmh, pph PTSD, anxiety, no hx of suicide attempt, no hx of inpatient psych admissions, who presents to ED with suicidal thoughts and PTSD sx,    Patient presents alert and oriented to person, place, time and situation.    Exhausted, tired, fighting battles for 8-10 years, have nothing left.  I won't do anything bc I have my sons, kids that love me  Done things everything I need - hitting bottom    Last Saturday - divorce   best arely's  is dying same way son   got through holidays  wears masks  lost gege unexpectatedly -   bed rest -   "I don't know if I will be able to save both of them" heard  saying this -   mom working out - SIDS - unknonw reason of death  8 months - genetic testing - no answer  Therapy, son in therapy,  in therapy  adopted o[facility]ia  ptsd -   2022 oct - thearpy 3 hours  detectives handcuffed for 6hours to go t   - no empathy and sympathy  come home to someone very   haven't slept in 8 years - was getting visions, terrificyed to go to sleep, having panic attacks  ptsd over loss of Marques    does wonder when will the rain stop  last 3 weeks -      friend's marianne had cardiac arrest   moved out and came back in october - comes at her - jabbing  Bereavement group - [facility]  didn't have normal stephen agnieszka/day  thursday - [place] - first child's anniversary  how come that person gets to be alive - your son chose to do heroin and overdosed, ahmad -     part of no return - begging for peace and for it all to stop  8 year fight    exhausted - overwhelmed,     Therapist - Elvia Chris-Roel - [phone]    poor sleep, hopelessness, low appetite, drained, interest low, motivation low - 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 Psychiatry consulted for mental health evaluation.    Patient presents alert and oriented to person, place, time and situation. She is cooperative and well related, wanted to have interview with her brother present for support. Patient reports that she is exhausted, tired, has been fighting battles for the last 8 years and has nothing left to give. She describes feeling anxious, having active PTSD sx including nightmares, visions, and flashbacks. Her sx were triggered by recent events involving friends and family members, including sudden death of friend's  in what is described as "the same way my son ." Patient reports that her son  suddenly and unexpectedly at 7 weeks old. Patient described traumatic birth in which she heard the doctor say "I don't know if I can save them both" to the team before administering anesthesia for an urgent . Patient had asked that her unborn child would be saved; both were able to survive though pt experienced hemorrhage and cardiac arrest. 7 weeks later, patient's son suddenly and for unknown reasons stopped breathing. He was taken to the hospital where patient was handcuffed and questioned for hours by detectives regarding son's condition.       I won't do anything bc I have my sons, kids that love me  best arely's  is dying same way son   got through holidays  wears masks  [facility]   bed rest -   "I don't know if I will be able to save both of them" heard  saying this -   mom working out - SIDS - unknonw reason of death  8 months - genetic testing - no answer  Therapy, son in therapy,  in therapy  adopted o[facility]  ptsd -   2022 oct - thearpy 3 hours  detectives handcuffed for 6hours to go t   - no empathy and sympathy  come home to someone very   haven't slept in 8 years - was getting visions, terrificyed to go to sleep, having panic attacks  ptsd over loss of Marques    does wonder when will the rain stop  last 3 weeks -      friend's geraldinen had cardiac arrest   moved out and came back in october - comes at her - jabbing  Bereavement group - christopher's house  didn't have normal stephen agnieszka/day  thursday -  - first child's anniversary  how come that person gets to be alive - your son chose to do heroin and overdosed, ahmad -     part of no return - begging for peace and for it all to stop  8 year fight    exhausted - overwhelmed,     poor sleep, hopelessness, low appetite, drained, interest low, motivation low - 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 Psychiatry consulted for mental health evaluation.    Patient presents alert and oriented to person, place, time and situation. She is cooperative and well related, wanted to have interview with her brother present for support. Patient reports that she is exhausted, tired, has been fighting battles for the last 8 years and has nothing left to give. She describes feeling anxious, having active PTSD sx including nightmares, visions, and flashbacks. Also describes panic attacks and fear of going to sleep. Her sx were triggered by recent events involving friends and family members, including sudden death of friend's  in what is described as "the same way my son ." Patient reports that her son  suddenly and unexpectedly at 7 weeks old. Patient described traumatic birth in which she heard the doctor say "I don't know if I can save them both" to the team before administering anesthesia for an urgent . Patient had asked that her unborn child would be saved; both were able to survive though pt experienced hemorrhage and cardiac arrest. 7 weeks later, patient's son suddenly and for unknown reasons stopped breathing. He was taken to the hospital where patient was handcuffed and questioned for hours by detectives regarding son's condition. Son was transferred to [facility] where family made decision to withdraw life support. Since then, pt has started a bereavement group called [facility]. Patient supports other parents grieving child loss. She has been very active w/ group lately.     Patient describes other triggers of PTSD and anxiety to include divorce with  who she is currently living with. Describes  as having a lack of empathy and sympathy. Reports having difficulty getting through the holidays due to divorce and son's death. Patient states that she is good at masking her true feelings and is able to say that she has not been able to grieve properly.    Patient describes depressed mood, very poor quality sleep, sleeping a few hours each night, low appetite, low energy, poor concentration, low motivation and interest, feels drained, hopeless. She denies suicidal ideation and states that previously documented thoughts of suicidal ideation are incorrect. Names her sons as reasons to live, as well as being a teacher.     Patient is currently in therapy and receives klonopin from her PCP that she uses prn.    Collateral - see  Note - theraspit in support of admission.

## 2025-01-11 NOTE — ED ADULT NURSE NOTE - OBJECTIVE STATEMENT
44 year old female domiciled with  and two children was BIB her brother for c/o increasing depressive symptoms. Pt tearfully verbalized that she has PTSD from 7-8 year ago after witnessing her 2 month old son die in her care. Pt  c/o increasing stress at home and said that her  is verbally abusive. 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 Pt expressed she never have the courage to commit suicide because of her children. Pt denied HI/VH/AH, alcohol and substance use.Pt smokes cigarettes.  Pt seeks voluntary admission to psychiatry saying " I need to, I am not good right now". Pt was placed in gowns, security wanded and took pt belongings while her brother took her valuables. Constant observation initiated.

## 2025-01-11 NOTE — ED PROVIDER NOTE - PROGRESS NOTE DETAILS
SG tele psych consulted. will recheck pt vitals, vitals taken when patient was upset. Lillie Montgomery PGY3: Patient states she would like to go home, with no longer wants voluntary admission.  Reached out to telepsychiatry for reevaluation of patient.  They are currently talking to patient.  Pending final recommendations from them regarding patient's ability to be safely discharged home instead of continuing with voluntary admission. SG tele psych consulted. will recheck pt vitals, vitals taken when patient was upset. Patient endorsed to me at signout.  Psychiatry evaluated patient and patient signed voluntary admission paperwork. Lillie Montgomery PGY3: MD Alvarez spoke with tele psychiatrist who states that pt is cleared for DC home. DC instructions and return precautions discussed. Questions answered. Pt to follow up with outpatient psych and crisis center.

## 2025-01-11 NOTE — ED BEHAVIORAL HEALTH ASSESSMENT NOTE - CURRENT MEDICATION
Klonopin 1 mg BID prn  Ref: [id number]  Rx since 4/2024, monthly pick ups, no red flags.    PDI	My Rx	Current Rx	Drug Type	Rx Written	Rx Dispensed	Drug	Quantity	Days Supply	Prescriber Name	Prescriber ALMA DELIA #	Payment Method	Dispenser  A	N	Y	B	12/19/2024	12/19/2024	clonazepam 1 mg tablet	60	30	Kamran Taylor	[id number]	Insurance	[facility] #[id number]  A	N	N	B	11/08/2024	11/13/2024	clonazepam 1 mg tablet	60	30	TaylorKamran lopez	[id number]	Insurance	[facility] #[id number]  A	N	N	B	11/08/2024	11/08/2024	clonazepam 1 mg tablet	6	3	Amanda Patiño	[id number]	Insurance	[facility] #[id number]  A	N	N	B	10/01/2024	10/04/2024	clonazepam 1 mg tablet	60	30	TaylorKamran lopez	[id number]	Insurance	[facility] #[id number]  A	N	N	B	08/09/2024	08/09/2024	clonazepam 1 mg tablet	60	30	TaylorKamran lopez	[id number]	Insurance	[facility] #[id number]  A	N	N	B	06/16/2024	06/17/2024	clonazepam 1 mg tablet	60	30	TaylorKamran christopher	[id number]	Insurance	[facility] #[id number]  A	N	N	B	05/17/2024	05/17/2024	clonazepam 1 mg tablet	60	30	TaylorKamran lopez	[id number]	Insurance	[facility] #[id number]  A	N	N	B	04/02/2024	04/04/2024	clonazepam 1 mg tablet	60	30	Kamran Taylor	[id number]	Insurance	[facility] #[id number]

## 2025-01-11 NOTE — ED ADULT NURSE NOTE - NSFALLUNIVINTERV_ED_ALL_ED
Bed/Stretcher in lowest position, wheels locked, appropriate side rails in place/Call bell, personal items and telephone in reach/Instruct patient to call for assistance before getting out of bed/chair/stretcher/Non-slip footwear applied when patient is off stretcher/[place] to call system/Physically safe environment - no spills, clutter or unnecessary equipment/Purposeful proactive rounding/Room/bathroom lighting operational, light cord in reach

## 2025-01-11 NOTE — ED BEHAVIORAL HEALTH ASSESSMENT NOTE - SUMMARY
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 Psychiatry consulted for mental health evaluation.    Patient presents with sx of depression, anxiety and PTSD. She describes several social stressors including divorce, work stress, and grief she has not been able to manage since the death of her son 9 years ago. Patient describes feeling hopeless, overwhelmed and struggling with PTSD sx. She denies suicidal ideation, sx of psychosis, deanne. She would benefit from an inpatient admission for medication management in a structured setting and was offered voluntary admission. Patient initially accepted voluntary and then withdrew, asking for outpatient resources. 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 Psychiatry consulted for mental health evaluation.    Patient presents with sx of depression, anxiety and PTSD. She describes several social stressors including divorce, work stress, and grief she has not been able to manage since the death of her son 9 years ago. Patient describes feeling hopeless, overwhelmed and struggling with PTSD sx. She demonstrates limited ability to cope with current stressors. She denies suicidal ideation, sx of psychosis, deanne. She currently sees a therapist and is prescribed klonopin by her PCP which she uses prn. Patient would benefit from an inpatient admission for medication management in a structured setting and was offered voluntary admission. Patient accepted.

## 2025-01-11 NOTE — ED PROVIDER NOTE - NSFOLLOWUPINSTRUCTIONS_ED_ALL_ED_FT
Discharge Instructions for Depression    Follow-up: It is crucial to schedule a follow-up appointment with your primary care physician or a mental health professional as soon as possible. We recommend scheduling this within 2-3 days. We have provided you with contact information for local mental health resources. If you are experiencing suicidal thoughts, please contact the crisis hotline immediately.    Medications:    Current Medications: Continue taking your currently prescribed medications unless otherwise instructed by your physician. Do not stop taking your medications abruptly, as this can cause withdrawal symptoms.  New Medications (if prescribed): If you have been prescribed new medication(s) for depression, please follow the instructions provided carefully. It may take several weeks for these medications to reach their full effect. Do not discontinue use without first consulting your physician.  Medication Side Effects: Be aware of potential side effects and report any concerns to your physician or pharmacist.  Safety Plan:    Suicidal Thoughts: If you experience suicidal thoughts, please seek immediate help. Contact the crisis hotline, [facility], or go to the nearest emergency room.  Support System: Identify and connect with supportive family members or friends. Let them know how they can help you.  Remove Potential Dangers: If you have access to firearms or other means of self-harm, ensure these are removed from your environment. Enlist the help of a trusted friend or family member if needed.  Coping Strategies:    Therapy: Therapy, such as cognitive-behavioral therapy (CBT) or interpersonal therapy, can be very effective in treating depression. We have provided you with a list of local therapists and resources.  Support Groups: Consider joining a support group for people with depression. Sharing your experiences with others can be helpful.  Lifestyle Changes:  Regular Exercise: Engage in regular physical activity, even if it's just a short walk each day. Exercise has been shown to improve mood.  Healthy Diet: Eat a balanced diet and avoid excessive caffeine and alcohol.  Regular Sleep Schedule: Maintain a regular sleep schedule and aim for 7-9 hours of sleep per night.  Stress Management: Practice stress-reducing techniques such as yoga, meditation, or deep breathing exercises.  Avoid Isolation: Spend time with loved ones and engage in activities you enjoy.  What to Watch For:    Worsening Symptoms: If your symptoms worsen or you experience new symptoms, contact your doctor or mental health professional immediately.  Suicidal Thoughts: If you have thoughts of harming yourself, seek immediate help.  Side Effects from Medications: Monitor for any side effects from your medications and report them to your physician.  When to Seek Immediate Medical Attention:    Suicidal thoughts or plans  Severe worsening of depression symptoms  Any thoughts of harming yourself or others  Severe side effects from medications

## 2025-01-11 NOTE — ED PROVIDER NOTE - OBJECTIVE STATEMENT
43 yo w hx PTSD anxiety on clonapin p/w suicidal thoughts. patient has been going through a divorce with her  who she says has said verbally abusive things to her, and it is reactivating her PTSD which she had from having post partum hemorrhage and cardiac arrest, and then that same son passing away at 7 weeks. tonight went to the park and was sobbing and thinking of ways she could kill herself without causing pain, no clear plan. no prior suicidal thoughts. denies any HI, auditory or visual hallucinations.

## 2025-01-11 NOTE — ED ADULT TRIAGE NOTE - CHIEF COMPLAINT QUOTE
recent life events, now depressed and expressing wanting to hurt self  no plan, no attempt  denies HI

## 2025-01-11 NOTE — ED BEHAVIORAL HEALTH ASSESSMENT NOTE - DESCRIPTION
lumbar herniated disc Patient BIBS for worsening PTSD, anxiety and suicidal thoughts.     BP on arrival elevated.    Vital Signs Last 24 Hrs  T(C): 36.3 (11 Jan 2025 16:22), Max: 36.3 (11 Jan 2025 16:22)  T(F): 97.4 (11 Jan 2025 16:22), Max: 97.4 (11 Jan 2025 16:22)  HR: 106 (11 Jan 2025 16:22) (106 - 106)  BP: 198/117 (11 Jan 2025 16:22) (198/117 - 198/117)  BP(mean): --  RR: 20 (11 Jan 2025 16:22) (20 - 20)  SpO2: 100% (11 Jan 2025 16:22) (100% - 100%)    Parameters below as of 11 Jan 2025 16:22  Patient On (Oxygen Delivery Method): room air see hpi Patient BIBS for worsening PTSD, anxiety and suicidal thoughts.     BP on arrival elevated, recovered w/o intervention.    Vital Signs Last 24 Hrs  T(C): 36.4 (11 Jan 2025 20:57), Max: 36.4 (11 Jan 2025 20:57)  T(F): 97.5 (11 Jan 2025 20:57), Max: 97.5 (11 Jan 2025 20:57)  HR: 83 (11 Jan 2025 20:57) (83 - 106)  BP: 149/91 (11 Jan 2025 20:57) (149/91 - 198/117)  BP(mean): --  RR: 18 (11 Jan 2025 20:57) (18 - 20)  SpO2: 96% (11 Jan 2025 20:57) (96% - 100%)    Parameters below as of 11 Jan 2025 20:57  Patient On (Oxygen Delivery Method): room air

## 2025-01-11 NOTE — ED BEHAVIORAL HEALTH ASSESSMENT NOTE - RISK ASSESSMENT
Protective factors are patient's responsibility to family, support of family, no access to lethal means, no history of suicide attempts, identifies reasons for living, future plans, engaged in work or school, fear of death or actual act of killing self, Holiness beliefs, positive therapeutic relationships, ability to cope with stress, frustration tolerance, beloved pets, housing stability.    Risk factors include no connection to outpatient treatment, medication non-adherence, poor social support, housing instability, financial instability, depressed mood/psychosis/deanne/ADHD/TBI, substance use disorder, prior suicide attempts, cutting, stressors, impulsivity.    Patient is at low risk of suicide and protective factors mitigate risk factors at this time. Patient has no suicidal thoughts, no intent, or plan.

## 2025-01-11 NOTE — ED BEHAVIORAL HEALTH NOTE - BEHAVIORAL HEALTH NOTE
========================     NAME: Elvia Sevilla     NUMBER: [phone]     RELATIONSHIP: pts therapist     RELIABILITY:  reliable     COMMENTS:  n.a          ========================     PATIENT DEMOGRAPHICS:     ========================     HPI     BASELINE FUNCTIONING: therapist reported to come in due to increase of symptoms. Hx of depression and PTSD symptoms     DATE HPI STARTED: pt has been dealing with depressive symptoms and PTSD since passing of her child several years ago reported by therapist     DECOMPENSATION: therapist reported increase in social stressors this past week, relationship, divorce and friends  passing          SUICIDALITY; pts therapist denies suicidality of pt     VIOLENCE: ?pts therapist denies violence hx     SUBSTANCE: pts therapist denies hx of substance use               ========================     PAST PSYCHIATRIC HISTORY     ========================     DATE PAST PSYCHIATRIC HISTORY STARTED: pt has been dealing with depressive symptoms and PTSD since passing of her child several years ago reported by therapist     MAIN PSYCHIATRIC DIAGNOSIS: pts therapist reports she sees pt as depressed     PSYCHIATRIC HOSPITALIZATIONS: ?pts therapist denies     PRIOR ILLNESS:pt therapist reports hx of depression and PTSD     SUICIDALITY; pts therapist denies suicidality of pt     VIOLENCE: ?pts therapist denies violence hx     SUBSTANCE: pts therapist denies hx of substance use          ==============     OTHER HISTORY     ==============     CURRENT MEDICATION: ?pts therapist reports pt is prescribed medication through PCP. Pts therapist states she has been encouraging medication management and thinks pt should see a MH [provider for medication management      MEDICAL HISTORY: ?pts therapist denies     ALLERGIES ?pts therapist denies     LEGAL ISSUES: ?pts therapist denies     FIREARM ACCESS: ?pts therapist denies     SOCIAL HISTORY: pts therapist reports pt is engaged in community organizations and support     FAMILY HISTORY: pts therapist reports pt has supportive family, reports she is  from  this past year, reports pt has suffered loss of child several years ago, reports pt has a 8yo son     DEVELOPMENTAL HISTORY: denies concerns

## 2025-01-11 NOTE — ED PROVIDER NOTE - ATTENDING CONTRIBUTION TO CARE
------------ATTENDING NOTE------------  pt c/o increasing stress / depression / anxiety, thoughts of death / suicide but no active plan, stress over ongoing divorce / death of son, no drug use, no additional complaints, has a outpt therapist who agrees w/ her coming to ED, awaiting labs / medical clearance / psych consult -->  - Neal Villagomez MD   ---------------------------------------------------- ------------ATTENDING NOTE------------  pt c/o increasing stress / depression / anxiety, thoughts of death / suicide but no active plan, stress over ongoing divorce / death of son, no drug use, no additional complaints, has a outpt therapist who agrees w/ her coming to ED, awaiting labs / medical clearance / psych consult --> medically cleared, ED sign out 2300 pending planned voluntary admission to [facility] Psych / Women's Floor, 913 Form completed w/ pt consent / understanding (still pending [facility] final recs/ consult).  - Neal Villagomez MD   ----------------------------------------------------

## 2025-01-12 VITALS
OXYGEN SATURATION: 98 % | SYSTOLIC BLOOD PRESSURE: 138 MMHG | RESPIRATION RATE: 17 BRPM | HEART RATE: 72 BPM | DIASTOLIC BLOOD PRESSURE: 82 MMHG | TEMPERATURE: 98 F

## 2025-01-12 LAB
SARS-COV-2 RNA SPEC QL NAA+PROBE: SIGNIFICANT CHANGE UP
TSH SERPL-MCNC: 1.41 UIU/ML — SIGNIFICANT CHANGE UP (ref 0.27–4.2)

## 2025-01-13 ENCOUNTER — INPATIENT (INPATIENT)
Facility: HOSPITAL | Age: 45
LOS: 3 days | Discharge: HOME | End: 2025-01-17
Attending: PSYCHIATRY & NEUROLOGY | Admitting: PSYCHIATRY & NEUROLOGY
Payer: COMMERCIAL

## 2025-01-13 ENCOUNTER — OUTPATIENT (OUTPATIENT)
Dept: OUTPATIENT SERVICES | Facility: HOSPITAL | Age: 45
LOS: 1 days | Discharge: ANOTHER HOSPITAL | End: 2025-01-13
Payer: COMMERCIAL

## 2025-01-13 VITALS — HEIGHT: 66 IN | WEIGHT: 203.05 LBS | TEMPERATURE: 97 F

## 2025-01-13 DIAGNOSIS — Z98.891 HISTORY OF UTERINE SCAR FROM PREVIOUS SURGERY: Chronic | ICD-10-CM

## 2025-01-13 DIAGNOSIS — Z98.890 OTHER SPECIFIED POSTPROCEDURAL STATES: Chronic | ICD-10-CM

## 2025-01-13 DIAGNOSIS — F33.9 MAJOR DEPRESSIVE DISORDER, RECURRENT, UNSPECIFIED: ICD-10-CM

## 2025-01-13 LAB
A1C WITH ESTIMATED AVERAGE GLUCOSE RESULT: 5.4 % — SIGNIFICANT CHANGE UP (ref 4–5.6)
ALBUMIN SERPL ELPH-MCNC: 4.1 G/DL — SIGNIFICANT CHANGE UP (ref 3.3–5)
ALP SERPL-CCNC: 52 U/L — SIGNIFICANT CHANGE UP (ref 40–120)
ALT FLD-CCNC: 17 U/L — SIGNIFICANT CHANGE UP (ref 4–33)
ANION GAP SERPL CALC-SCNC: 12 MMOL/L — SIGNIFICANT CHANGE UP (ref 7–14)
AST SERPL-CCNC: 18 U/L — SIGNIFICANT CHANGE UP (ref 4–32)
BASOPHILS # BLD AUTO: 0.07 K/UL — SIGNIFICANT CHANGE UP (ref 0–0.2)
BASOPHILS NFR BLD AUTO: 0.5 % — SIGNIFICANT CHANGE UP (ref 0–2)
BILIRUB SERPL-MCNC: 0.3 MG/DL — SIGNIFICANT CHANGE UP (ref 0.2–1.2)
BUN SERPL-MCNC: 12 MG/DL — SIGNIFICANT CHANGE UP (ref 7–23)
CALCIUM SERPL-MCNC: 9.2 MG/DL — SIGNIFICANT CHANGE UP (ref 8.4–10.5)
CHLORIDE SERPL-SCNC: 103 MMOL/L — SIGNIFICANT CHANGE UP (ref 98–107)
CO2 SERPL-SCNC: 22 MMOL/L — SIGNIFICANT CHANGE UP (ref 22–31)
CREAT SERPL-MCNC: 0.88 MG/DL — SIGNIFICANT CHANGE UP (ref 0.5–1.3)
EGFR: 83 ML/MIN/1.73M2 — SIGNIFICANT CHANGE UP
EOSINOPHIL # BLD AUTO: 0.15 K/UL — SIGNIFICANT CHANGE UP (ref 0–0.5)
EOSINOPHIL NFR BLD AUTO: 1.1 % — SIGNIFICANT CHANGE UP (ref 0–6)
ESTIMATED AVERAGE GLUCOSE: 108 — SIGNIFICANT CHANGE UP
FLUAV AG NPH QL: SIGNIFICANT CHANGE UP
FLUBV AG NPH QL: SIGNIFICANT CHANGE UP
GLUCOSE SERPL-MCNC: 92 MG/DL — SIGNIFICANT CHANGE UP (ref 70–99)
HCG SERPL-ACNC: <1 MIU/ML — SIGNIFICANT CHANGE UP
HCT VFR BLD CALC: 39.3 % — SIGNIFICANT CHANGE UP (ref 34.5–45)
HGB BLD-MCNC: 13.1 G/DL — SIGNIFICANT CHANGE UP (ref 11.5–15.5)
IANC: 8.32 K/UL — HIGH (ref 1.8–7.4)
IMM GRANULOCYTES NFR BLD AUTO: 0.4 % — SIGNIFICANT CHANGE UP (ref 0–0.9)
LYMPHOCYTES # BLD AUTO: 30.2 % — SIGNIFICANT CHANGE UP (ref 13–44)
LYMPHOCYTES # BLD AUTO: 4.09 K/UL — HIGH (ref 1–3.3)
MCHC RBC-ENTMCNC: 29.9 PG — SIGNIFICANT CHANGE UP (ref 27–34)
MCHC RBC-ENTMCNC: 33.3 G/DL — SIGNIFICANT CHANGE UP (ref 32–36)
MCV RBC AUTO: 89.7 FL — SIGNIFICANT CHANGE UP (ref 80–100)
MONOCYTES # BLD AUTO: 0.86 K/UL — SIGNIFICANT CHANGE UP (ref 0–0.9)
MONOCYTES NFR BLD AUTO: 6.4 % — SIGNIFICANT CHANGE UP (ref 2–14)
NEUTROPHILS # BLD AUTO: 8.32 K/UL — HIGH (ref 1.8–7.4)
NEUTROPHILS NFR BLD AUTO: 61.4 % — SIGNIFICANT CHANGE UP (ref 43–77)
NRBC # BLD: 0 /100 WBCS — SIGNIFICANT CHANGE UP (ref 0–0)
NRBC # FLD: 0 K/UL — SIGNIFICANT CHANGE UP (ref 0–0)
PLATELET # BLD AUTO: 335 K/UL — SIGNIFICANT CHANGE UP (ref 150–400)
POTASSIUM SERPL-MCNC: 4 MMOL/L — SIGNIFICANT CHANGE UP (ref 3.5–5.3)
POTASSIUM SERPL-SCNC: 4 MMOL/L — SIGNIFICANT CHANGE UP (ref 3.5–5.3)
PROT SERPL-MCNC: 6.7 G/DL — SIGNIFICANT CHANGE UP (ref 6–8.3)
RBC # BLD: 4.38 M/UL — SIGNIFICANT CHANGE UP (ref 3.8–5.2)
RBC # FLD: 12.7 % — SIGNIFICANT CHANGE UP (ref 10.3–14.5)
RSV RNA NPH QL NAA+NON-PROBE: SIGNIFICANT CHANGE UP
SARS-COV-2 RNA SPEC QL NAA+PROBE: SIGNIFICANT CHANGE UP
SODIUM SERPL-SCNC: 137 MMOL/L — SIGNIFICANT CHANGE UP (ref 135–145)
TSH SERPL-MCNC: 0.89 UIU/ML — SIGNIFICANT CHANGE UP (ref 0.27–4.2)
WBC # BLD: 13.54 K/UL — HIGH (ref 3.8–10.5)
WBC # FLD AUTO: 13.54 K/UL — HIGH (ref 3.8–10.5)

## 2025-01-13 PROCEDURE — 93010 ELECTROCARDIOGRAM REPORT: CPT

## 2025-01-13 PROCEDURE — 99223 1ST HOSP IP/OBS HIGH 75: CPT

## 2025-01-13 RX ORDER — TRAZODONE HYDROCHLORIDE 150 MG/1
50 TABLET ORAL AT BEDTIME
Refills: 0 | Status: DISCONTINUED | OUTPATIENT
Start: 2025-01-13 | End: 2025-01-17

## 2025-01-13 RX ORDER — VENLAFAXINE HYDROCHLORIDE 75 MG/1
37.5 CAPSULE, EXTENDED RELEASE ORAL DAILY
Refills: 0 | Status: DISCONTINUED | OUTPATIENT
Start: 2025-01-13 | End: 2025-01-17

## 2025-01-13 RX ORDER — NICOTINE POLACRILEX 4 MG/1
2 LOZENGE ORAL EVERY 4 HOURS
Refills: 0 | Status: DISCONTINUED | OUTPATIENT
Start: 2025-01-13 | End: 2025-01-17

## 2025-01-13 RX ORDER — CLONAZEPAM 2 MG
1 TABLET ORAL
Refills: 0 | Status: DISCONTINUED | OUTPATIENT
Start: 2025-01-13 | End: 2025-01-14

## 2025-01-13 RX ORDER — LORAZEPAM 1 MG/1
2 TABLET ORAL ONCE
Refills: 0 | Status: DISCONTINUED | OUTPATIENT
Start: 2025-01-13 | End: 2025-01-14

## 2025-01-13 RX ORDER — HALOPERIDOL DECANOATE 50 MG/ML
5 INJECTION INTRAMUSCULAR ONCE
Refills: 0 | Status: DISCONTINUED | OUTPATIENT
Start: 2025-01-13 | End: 2025-01-17

## 2025-01-13 RX ORDER — HALOPERIDOL DECANOATE 50 MG/ML
5 INJECTION INTRAMUSCULAR EVERY 6 HOURS
Refills: 0 | Status: DISCONTINUED | OUTPATIENT
Start: 2025-01-13 | End: 2025-01-17

## 2025-01-13 RX ORDER — PRAZOSIN HYDROCHLORIDE 5 MG/1
1 CAPSULE ORAL AT BEDTIME
Refills: 0 | Status: DISCONTINUED | OUTPATIENT
Start: 2025-01-13 | End: 2025-01-17

## 2025-01-13 RX ORDER — LORAZEPAM 1 MG/1
2 TABLET ORAL EVERY 6 HOURS
Refills: 0 | Status: DISCONTINUED | OUTPATIENT
Start: 2025-01-13 | End: 2025-01-14

## 2025-01-13 RX ORDER — NICOTINE POLACRILEX 4 MG/1
1 LOZENGE ORAL DAILY
Refills: 0 | Status: DISCONTINUED | OUTPATIENT
Start: 2025-01-13 | End: 2025-01-17

## 2025-01-13 RX ADMIN — NICOTINE POLACRILEX 2 MILLIGRAM(S): 4 LOZENGE ORAL at 18:41

## 2025-01-13 RX ADMIN — PRAZOSIN HYDROCHLORIDE 1 MILLIGRAM(S): 5 CAPSULE ORAL at 21:03

## 2025-01-13 NOTE — BH INPATIENT PSYCHIATRY ASSESSMENT NOTE - NSBHCHARTREVIEWVS_PSY_A_CORE FT
Vital Signs Last 24 Hrs  T(C): --  T(F): --  HR: --  BP: --  BP(mean): --  RR: --  SpO2: --     Vital Signs Last 24 Hrs  T(C): 36.1 (01-13-25 @ 15:59), Max: 36.1 (01-13-25 @ 15:59)  T(F): 97 (01-13-25 @ 15:59), Max: 97 (01-13-25 @ 15:59)  HR: --  BP: --  BP(mean): --  RR: --  SpO2: --    Orthostatic VS  01-13-25 @ 15:59  Lying BP: --/-- HR: --  Sitting BP: 138/84 HR: 91  Standing BP: 142/92 HR: 100  Site: --  Mode: --

## 2025-01-13 NOTE — BH PATIENT PROFILE - HAS THE PATIENT EXPERIENCED ANY OF THE FOLLOWING WITHIN THE WEEK PRIOR TO ADMISSION?
Patient informed of lab results and message from Dr. Foy. Patient verbalized understanding. He would like to follow Dr. Foy out to [place] and was transferred to [facility] to make appointment.    no

## 2025-01-13 NOTE — BH PATIENT PROFILE - HOME MEDICATIONS
Vitamin D3 25 mcg (1000 intl units) oral tablet , 1 tab(s) orally once a day  Metamucil 3.4 g/3.7 g oral powder for reconstitution , orally once a day

## 2025-01-13 NOTE — BH PATIENT PROFILE - PATIENT'S GENDER IDENTITY
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    INTERVAL HISTORY: I had the pleasure of seeing Ms. Colunga today in follow up for her breast cancer.  Patient had her recent mammogram the initial read was incomplete and patient was called back for more focused views. Fortunately on her focused views there was no evidence of disease BI-RADS Category 1. She is experiencing some hot flashes from her aromatase inhibitor but otherwise is doing well.    PHYSICAL EXAM:    Vitals:    10/17/17 1030   BP: 157/86   Pulse: 83   Temp: 37.1 °C (98.8 °F)   SpO2: 98%   Weight: 82.6 kg (182 lb)   Pain Score: No pain      1= Restricted in physically strenuous activity, but ambulatory and able to carry out work of a light sedentary nature, e.g., light housework, office work.      GENERAL: No apparent distress.  HEENT:  Pupils are equal, round, and reactive to light.  Extraocular muscles   are intact. Sclerae nonicteric.  Conjunctivae pink.  Oral cavity, tongue   protrudes midline.   NECK:  Supple without evidence of thyromegaly.  NODES:  No peripheral adenopathy of the neck, supraclavicular fossa or axillae   bilaterally.  LUNGS:  Clear to ascultation bilaterally   HEART:  Regular rate and rhythm.  No murmur appreciated  BREAST: No suspicious lesions in either breast or axilla.  ABDOMEN:  Soft. No evidence of hepatosplenomegaly.  Positive bowel sounds.  EXTREMITIES:  Without Edema.  NEUROLOGIC:  Cranial nerves II through XII were intact. Normal stance and gait motor and sensory grossly within normal limits      IMPRESSION:   Stage IA (T1mic N0 M0) microinvasive papillary ductal carcinoma of   the left outer quadrant, ER/OR  positive, HER-2/jake negative, Ki-67 of 5%.  No evidence of disease    RECOMMENDATIONS:   I discussed the patient her findings over a 35 minute time period, 95% of that time dedicated to an ongoing survivorship plan. I discussed with the patient to follow-up options. The 1st option would be to go to as needed basis here in radiation oncology while she is seen in medical oncology for her aromatase inhibitor. 2nd would be to continue to see me on an annual basis for clinical evaluation. Currently patient felt most comfortable still being followed in radiation oncology so I'll plan on seeing her after her next years mammogram. Since Dr. Kahn is actively treating her for the aromatase inhibitor I will let his office order the mammography.    If patient has any questions or concerns, she should feel free to contact me.                    Female

## 2025-01-13 NOTE — BH PATIENT PROFILE - FALL HARM RISK - UNIVERSAL INTERVENTIONS
Bed in lowest position, wheels locked, appropriate side rails in place/Call bell, personal items and telephone in reach/Instruct patient to call for assistance before getting out of bed or chair/Non-slip footwear when patient is out of bed/[employer] to call system/Physically safe environment - no spills, clutter or unnecessary equipment/Purposeful Proactive Rounding/Room/bathroom lighting operational, light cord in reach

## 2025-01-13 NOTE — BH INPATIENT PSYCHIATRY ASSESSMENT NOTE - RISK ASSESSMENT
RISK:  - Modifiable risk factors: s/s of depression and PTSD  - Unmodifiable risk factors: trauma hx, interpersonal stressors  - Protective factors: supportive family, help-seeking, motivated for tx

## 2025-01-13 NOTE — BH INPATIENT PSYCHIATRY ASSESSMENT NOTE - NSBHMETABOLIC_PSY_ALL_CORE_FT
BMI:   HbA1c:   Glucose:   BP: --Vital Signs Last 24 Hrs  T(C): --  T(F): --  HR: --  BP: --  BP(mean): --  RR: --  SpO2: --      Lipid Panel:  BMI: BMI (kg/m2): 32.8 (01-13-25 @ 15:59)  HbA1c: A1C with Estimated Average Glucose Result: 5.4 % (01-13-25 @ 15:30)    Glucose:   BP: --Vital Signs Last 24 Hrs  T(C): 36.1 (01-13-25 @ 15:59), Max: 36.1 (01-13-25 @ 15:59)  T(F): 97 (01-13-25 @ 15:59), Max: 97 (01-13-25 @ 15:59)  HR: --  BP: --  BP(mean): --  RR: --  SpO2: --    Orthostatic VS  01-13-25 @ 15:59  Lying BP: --/-- HR: --  Sitting BP: 138/84 HR: 91  Standing BP: 142/92 HR: 100  Site: --  Mode: --    Lipid Panel:

## 2025-01-13 NOTE — BH INPATIENT PSYCHIATRY ASSESSMENT NOTE - HPI (INCLUDE ILLNESS QUALITY, SEVERITY, DURATION, TIMING, CONTEXT, MODIFYING FACTORS, ASSOCIATED SIGNS AND SYMPTOMS)
Per Crisis Clinic note: "Patient is a 43y/o F with PTSD, MDD, CASEY, no prior hospitalizations, no SA/SIB, who presented for inpatient admission for worsening anxiety and PTSD symptoms. Pt was seen in [facility] ED on 1/11/2025, chart reviewed. Pt cited multiple stressors, including currently ongoing divorce, and sudden death of a friend’s . Stated that she has been fighting battles of the last 8 years; feels anxious, endorsed nightmares and flashback, and panic attacks and fear of going to sleep. Pt with significant trauma hx of traumatic birth and death of infant son in 2016. Pt founded a bereavement group at [facility], supports other parents, has been very active lately. Cited difficult relationship with ; they are  but still living together. Reports depressed mood, poor sleep, low appetite, low energy, poor concentration, low motivation and decreased interest, feels hopeless. 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 Is currently in therapy, is on Klonopin PRN rx by PCP. Pt initially agreed to voluntary admission, then stated that she would prefer to return home as she had been waiting for a long time. Pt was cleared by telepsych. Pt’s therapist was also contact from ED, had encouraged pt to come in d/t increased social stressors and encouraged pt to seek voluntary admission. Did not report imminent safety concerns re: SI/HI.   On interview today, pt states that she is not doing well, has not been functioning well, and feels she needs inpatient admission. She has been working with her therapist on and off since 4/2016; for the last 19 months has been seeing her weekly. Finds it helpful, but symptoms have been getting worse in context of recent stressors. Feels like she is “done,” feels hopeless. Has difficulty trusting others or being vulnerable, does not open up with most people about what she is feeling. Is “going through the motions” but feels emotionally numb.   Reports that her best friend’s  passed away on 12/12/24, in the same manner that her son passed in 2016, which triggered a lot of emotions. Has had interpersonal issues with family members. Cites coping skills of driving; this has not been helping recently. Last Saturday,  made antagonistic comment which upset her. Sat in her car for several hours crying. Adamantly denies SI/intent/plan. States that she would never leave her boys. However, pt states that she feels hopeless, emotionally numb, very tired, with poor sleep (says she only gets about 1-2 hours/night). Appetite has been poor, has lost weight. Has been having panic attacks every few months. Has nightmares and flashbacks, exacerbated by friend’s  recent death. No deanne/hypomania/OCD. Discussed tx options; pt states that she had never been open to medications in the past but now feels that she is at the point where she needs treatment. Feels hopeless, unable to function and unable to cope in home environment, seeking inpatient admission.     ROS: Psychiatric and constitutional symptoms as above    PPH: MDD, PTSD, CASEY, no prior hospitalizations, no SA/SIB. No prior medication trials.  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  Legal Hx: denies  Access to firearms: denies  Subs: social alcohol use (1-2 drinks on the weekend w/ friends), no drug use, no cannabis, was smoking cigarettes until 1 month ago and now occasionally vapes      MSE notable for tearful but well-related; speech is normal vol/rate/tone, mood is “depressed,” affect is congruent; thought process is linear, organized, denies SI/HIAVH, no PI/delusions    RISK:  - Modifiable risk factors: s/s of depression and PTSD  - Unmodifiable risk factors: trauma hx, interpersonal stressors  - Protective factors: supportive family, help-seeking, motivated for tx  - Given above, the patient is clinically appropriate for outpatient care. Modifiable risk factors are being addressed as below.    ASSESSMENT:  Pt is a 43y/o F with PTSD, MDD, CASEY, no prior admissions, no SA/SIB, sees therapist, no prior med trials, who presented for inpatient admission for worsening anxiety and PTSD symptoms. Pt was seen in [facility] ED on 1/11, had agreed to admission but then became overwhelmed with the wait and requested to leave. Pt cites worsening depression, anxiety and PTSD symptoms in context of interpersonal stressors, ongoing divorce, death of friend. Tearful, emotionally numb, feels hopeless, ruminating thoughts, tired with poor sleep, nightmares and flashbacks. Feels unable to function, cannot cope in home environment. Pt requesting voluntary inpatient admission.     - Admitted to [address] [place] [address] swab 1/13/2025 was negative"

## 2025-01-13 NOTE — BH INPATIENT PSYCHIATRY ASSESSMENT NOTE - ATTENDING COMMENTS
Care was discussed and reviewed in the interdisciplinary treatment team.  I, Joanna Sanchez MD, have reviewed and verified the documentation.  I independently performed the documented medical decision making.    Patient was seen and evaluated with NP present during the assessment. Patient verbalized having significant PTSD and depressive symptoms. She has been experiencing anxiety and grieve all complicated by her  form her . Patient is willing to take medications and work with us on a safe discharge plan. Denies SI and has been able to contract for safety.

## 2025-01-13 NOTE — CHART NOTE - NSCHARTNOTEFT_GEN_A_CORE
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 No reported medical history re: direct family.      MEDICATIONS  (STANDING):  nicotine -   7 mG/24Hr(s) Patch 1 Patch Transdermal daily  prazosin. 1 milliGRAM(s) Oral at bedtime  Tri-Lo-Litzy (norgestimate/eth est) 1 Tablet(s) 1 Tablet(s) Oral daily  venlafaxine XR 37.5 milliGRAM(s) Oral daily    MEDICATIONS  (PRN):  clonazePAM  Tablet 1 milliGRAM(s) Oral two times a day PRN anxiety  haloperidol     Tablet 5 milliGRAM(s) Oral every 6 hours PRN agitation  haloperidol    Injectable 5 milliGRAM(s) IntraMuscular once PRN severe agitation  LORazepam     Tablet 2 milliGRAM(s) Oral every 6 hours PRN anxiety from agitation  LORazepam   Injectable 2 milliGRAM(s) IntraMuscular once PRN anxiety from severe agitation  nicotine  Polacrilex Gum 2 milliGRAM(s) Oral every 4 hours PRN Smoking Cessation  traZODone 50 milliGRAM(s) Oral at bedtime PRN insomnia      Vital Signs Last 24 Hrs  T(C): 36.1 (2025 15:59), Max: 36.1 (2025 15:59)  T(F): 97 (2025 15:59), Max: 97 (2025 15:59)  HR: 91 bpm (sitting) (2025 15:59), 100 bpm (standing) (2025 15:59)  BP: 138/84 (sitting) (2025 15:59), 142/92 (standing) (2025 15:59)  RR: 16  SpO2: 100% on RA    PHYSICAL EXAM:  GENERAL: NAD  HEAD:  Atraumatic, Normocephalic  EYES: Conjunctiva and sclera clear  NECK: Supple, No JVD  CHEST/LUNG: Clear to auscultation bilaterally; No wheeze, rales, rhonchi  HEART: Regular rate and rhythm; No murmurs, rubs, or gallops  ABDOMEN: Soft, Nontender, Nondistended; Bowel sounds present  EXTREMITIES:  2+ Peripheral Pulses, No clubbing, cyanosis, or edema  NEUROLOGY: non-focal  SKIN: No rashes or lesions    LABS:                        13.1   13.54 )-----------( 335      ( 2025 15:30 )             39.3         137  |  103  |  12  ----------------------------<  92  4.0   |  22  |  0.88    Ca    9.2      2025 15:30    TPro  6.7  /  Alb  4.1  /  TBili  0.3  /  DBili  x   /  AST  18  /  ALT  17  /  AlkPhos  52        Urinalysis Basic - ( 2025 15:30 )  Color: x / Appearance: x / SG: x / pH: x  Gluc: 92 mg/dL / Ketone: x  / Bili: x / Urobili: x   Blood: x / Protein: x / Nitrite: x   Leuk Esterase: x / RBC: x / WBC x   Sq Epi: x / Non Sq Epi: x / Bacteria: x      Assessment and Plan:  45 yo F w/ PPHx of PTSD, MDD, CASEY, no significant PMHx admitted to [facility] for worsening anxiety and PTSD symptoms. Patient seen at bedside for medical screening evaluation. Patient has no acute complaints at this time. Patient denies fever, chills, headache, dizziness, lightheadedness, N/V, SOB, cough, congestion, chest pain, abdominal pain, dysuria, hematuria, diarrhea, constipation. Physical exam unremarkable, VSS. Labs notable for leukocytosis, however afebrile, likely in setting of Klonopin use.    1.) MDD, PTSD, Anxiety : Refer to primary team documentation for recs  2.) Nicotine addiction: continue NRT

## 2025-01-13 NOTE — BH INPATIENT PSYCHIATRY ASSESSMENT NOTE - NSBHASSESSSUMMFT_PSY_ALL_CORE
Pt is a 43y/o F with PTSD, MDD, CASEY, no prior admissions, no SA/SIB, sees therapist, no prior med trials, who presented for inpatient admission for worsening anxiety and PTSD symptoms. Pt was seen in [facility] ED on , had agreed to admission but then became overwhelmed with the wait and requested to leave. Pt cites worsening depression, anxiety and PTSD symptoms in context of interpersonal stressors, ongoing divorce, death of friend. Tearful, emotionally numb, feels hopeless, ruminating thoughts, tired with poor sleep, nightmares and flashbacks. Feels unable to function, cannot cope in home environment. Pt requesting voluntary inpatient admission.     On 2W, pt seen by this writer and Dr. Huff. 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 Pt reports increased anxiety, global insomnia x 8 years (sleeps an hour per day), flashbacks, depressed, exhausted, isolating, poor motivation, anergia, anhedonia. Recent stressors include her best friend's  suddenly passing away and this reminded pt of how her son passed away, divorce, arguing with her family, not seeing her children for the holidays as a family, having a dinner with the families from [facility] that were having their first holidays without their  children. Pt denies A/VH or paranoia. Pt denies SIIP and agrees to come to staff immediately with any safety concerns. "I don't believe in tomorrows." Pt reports trying klonopin, xanax, melatonin in the past with poor results. Discussed medications with pt. Pt states she "surrenders" because she has never wanted psych meds, but feels she needs them now. Pt agrees to start prazosin 1 mg PO QHS and effexor 37.5 mg PO QD. Pt provided with medication education including but not limited possible side effects like sedation, low BP, GI upset, etc. Pt verbalized understanding. Pt with therapist Nohemi (doesn't have number on her, but will obtain for staff) and gives verbal consent for her brother Neal [phone]    PMH: ablation - she has her menstrual cycle but does not bleed  Allergies: denies  Substances: social ETOH     PLAN:   -voluntary admission to 2W  -start prazosin 1 mg PO QHS  -start effexor 37.5 mg PO QD  -start trazodone 50 mg PO PRN for insomnia  -c/w outpt klonopin 1 mg PO PRN BID  -PO/IM haldol and ativan for agitation   -obtain collaterals  -request psychology for individual therapy   -nicotine patch and gum for NRT   -encourage I/M/G therapy

## 2025-01-13 NOTE — BH INPATIENT PSYCHIATRY ASSESSMENT NOTE - NSBHATTESTAPPBILLTIME_PSY_A_CORE
I attest my time as TASHI is greater than 50% of the total combined time spent on qualifying patient care activities. I have reviewed and verified the documentation.

## 2025-01-14 LAB
CHOLEST SERPL-MCNC: 209 MG/DL — HIGH
HDLC SERPL-MCNC: 56 MG/DL — SIGNIFICANT CHANGE UP
LIPID PNL WITH DIRECT LDL SERPL: 121 MG/DL — HIGH
NON HDL CHOLESTEROL: 153 MG/DL — HIGH
SARS-COV-2 IGG+IGM SERPL QL IA: >250 U/ML — HIGH
SARS-COV-2 IGG+IGM SERPL QL IA: POSITIVE
TRIGL SERPL-MCNC: 230 MG/DL — HIGH

## 2025-01-14 PROCEDURE — 99232 SBSQ HOSP IP/OBS MODERATE 35: CPT

## 2025-01-14 RX ORDER — INFLUENZA A VIRUS A/WISCONSIN/588/2019 (H1N1) RECOMBINANT HEMAGGLUTININ ANTIGEN, INFLUENZA A VIRUS A/DARWIN/6/2021 (H3N2) RECOMBINANT HEMAGGLUTININ ANTIGEN, INFLUENZA B VIRUS B/AUSTRIA/1359417/2021 RECOMBINANT HEMAGGLUTININ ANTIGEN, AND INFLUENZA B VIRUS B/PHUKET/3073/2013 RECOMBINANT HEMAGGLUTININ ANTIGEN 45; 45; 45; 45 UG/.5ML; UG/.5ML; UG/.5ML; UG/.5ML
0.5 INJECTION INTRAMUSCULAR ONCE
Refills: 0 | Status: DISCONTINUED | OUTPATIENT
Start: 2025-01-14 | End: 2025-01-14

## 2025-01-14 RX ORDER — CLONAZEPAM 2 MG
1 TABLET ORAL
Refills: 0 | Status: DISCONTINUED | OUTPATIENT
Start: 2025-01-14 | End: 2025-01-17

## 2025-01-14 RX ORDER — LORAZEPAM 1 MG/1
2 TABLET ORAL EVERY 6 HOURS
Refills: 0 | Status: DISCONTINUED | OUTPATIENT
Start: 2025-01-14 | End: 2025-01-17

## 2025-01-14 RX ORDER — LORAZEPAM 1 MG/1
2 TABLET ORAL ONCE
Refills: 0 | Status: DISCONTINUED | OUTPATIENT
Start: 2025-01-14 | End: 2025-01-17

## 2025-01-14 RX ORDER — IRON/LYS/VIT B COMP/FOLIC ACID 800-1MG/15
1 LIQUID (ML) ORAL DAILY
Refills: 0 | Status: DISCONTINUED | OUTPATIENT
Start: 2025-01-14 | End: 2025-01-17

## 2025-01-14 RX ADMIN — NICOTINE POLACRILEX 2 MILLIGRAM(S): 4 LOZENGE ORAL at 12:01

## 2025-01-14 RX ADMIN — PRAZOSIN HYDROCHLORIDE 1 MILLIGRAM(S): 5 CAPSULE ORAL at 21:33

## 2025-01-14 RX ADMIN — NICOTINE POLACRILEX 1 PATCH: 4 LOZENGE ORAL at 08:56

## 2025-01-14 RX ADMIN — VENLAFAXINE HYDROCHLORIDE 37.5 MILLIGRAM(S): 75 CAPSULE, EXTENDED RELEASE ORAL at 08:53

## 2025-01-14 RX ADMIN — NICOTINE POLACRILEX 2 MILLIGRAM(S): 4 LOZENGE ORAL at 17:43

## 2025-01-14 NOTE — BH INPATIENT PSYCHIATRY PROGRESS NOTE - NSBHCHARTREVIEWVS_PSY_A_CORE FT
Vital Signs Last 24 Hrs  T(C): 36.6 (01-14-25 @ 08:38), Max: 36.6 (01-14-25 @ 08:38)  T(F): 97.9 (01-14-25 @ 08:38), Max: 97.9 (01-14-25 @ 08:38)  HR: --  BP: --  BP(mean): --  RR: --  SpO2: --    Orthostatic VS  01-14-25 @ 08:38  Lying BP: --/-- HR: --  Sitting BP: 132/86 HR: 103  Standing BP: 133/92 HR: 108  Site: --  Mode: --  Orthostatic VS  01-13-25 @ 15:59  Lying BP: --/-- HR: --  Sitting BP: 138/84 HR: 91  Standing BP: 142/92 HR: 100  Site: --  Mode: --

## 2025-01-14 NOTE — BH INPATIENT PSYCHIATRY PROGRESS NOTE - ATTENDING COMMENTS
Care was discussed and reviewed in the interdisciplinary treatment team.  I, Joanna Sanchez MD, have reviewed and verified the documentation.  I independently performed the documented medical decision making.

## 2025-01-14 NOTE — PSYCHIATRIC REHAB INITIAL EVALUATION - NSBHPRRECOMMEND_PSY_ALL_CORE
Writer met with Pt to orient her to Psych Rehab department and its functions. Pt was fully engaged, pleasant, forthcoming about her personal history but tearful  and depressed during the session. Pt identified her primary reason of hospitalization as worsening of anxiety and PTSD symptoms in context of (loss of her 2 months old son 8 years ago). Pt reports poor sleep, poor appetite and lack of motivation.  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 Per the chart Pt is with PTSD, MDD, CASEY. Pt cited difficult relationship with ; they are  but still living together. Mary Janer was able to establish a collaborative goal with Pt to work on the next seven days. Psychiatric Rehabilitation staff will continue to provide encouragement, support, psychotherapy, and psychoeducation to assist the Pt in the progression of her treatment goal and engagement with activities.

## 2025-01-14 NOTE — BH INPATIENT PSYCHIATRY PROGRESS NOTE - PRN MEDS
MEDICATIONS  (PRN):  clonazePAM  Tablet 1 milliGRAM(s) Oral two times a day PRN anxiety  haloperidol     Tablet 5 milliGRAM(s) Oral every 6 hours PRN agitation  haloperidol    Injectable 5 milliGRAM(s) IntraMuscular once PRN severe agitation  LORazepam     Tablet 2 milliGRAM(s) Oral every 6 hours PRN anxiety from agitation  LORazepam   Injectable 2 milliGRAM(s) IntraMuscular once PRN anxiety from severe agitation  nicotine  Polacrilex Gum 2 milliGRAM(s) Oral every 4 hours PRN Smoking Cessation  traZODone 50 milliGRAM(s) Oral at bedtime PRN insomnia

## 2025-01-14 NOTE — DIETITIAN INITIAL EVALUATION ADULT - NSICDXPASTMEDICALHX_GEN_ALL_CORE_FT
----- Message from Ines BUNCH sent at 10/16/2024 12:28 PM EDT -----  Regarding: Colonoscopy  10/16/24 12:29 PM    Hello, our patient Leif Huitron has had CRC: Colonoscopy completed/performed. Please assist in updating the patient chart by pulling the Care Everywhere (CE) document. The date of service is 4/19/2022.     Thank you,  BENJAMÍN Travis PG   PAST MEDICAL HISTORY:  Anxiety     Death of infant 2 and  half month old/ not SIDS as per pt    Ectopic pregnancy     Hemorrhoids     Hypertension in pregnancy     Lumbar herniated disc     Missed

## 2025-01-14 NOTE — DIETITIAN INITIAL EVALUATION ADULT - ORAL NUTRITION SUPPLEMENTS
Add Ensure Plus HP x1 daily (350 kcal, 20 gm protein) for patient to try, prefers chocolate flavor per patient; titrate up as needed

## 2025-01-14 NOTE — BH INPATIENT PSYCHIATRY PROGRESS NOTE - CURRENT MEDICATION
MEDICATIONS  (STANDING):  multivitamin/minerals 1 Tablet(s) Oral daily  nicotine -   7 mG/24Hr(s) Patch 1 Patch Transdermal daily  prazosin. 1 milliGRAM(s) Oral at bedtime  Tri-Lo-Litzy (norgestimate/eth est) 1 Tablet(s) 1 Tablet(s) Oral daily  venlafaxine XR 37.5 milliGRAM(s) Oral daily    MEDICATIONS  (PRN):  clonazePAM  Tablet 1 milliGRAM(s) Oral two times a day PRN anxiety  haloperidol     Tablet 5 milliGRAM(s) Oral every 6 hours PRN agitation  haloperidol    Injectable 5 milliGRAM(s) IntraMuscular once PRN severe agitation  LORazepam     Tablet 2 milliGRAM(s) Oral every 6 hours PRN anxiety from agitation  LORazepam   Injectable 2 milliGRAM(s) IntraMuscular once PRN anxiety from severe agitation  nicotine  Polacrilex Gum 2 milliGRAM(s) Oral every 4 hours PRN Smoking Cessation  traZODone 50 milliGRAM(s) Oral at bedtime PRN insomnia

## 2025-01-14 NOTE — DIETITIAN INITIAL EVALUATION ADULT - PERTINENT LABORATORY DATA
01-13    137  |  103  |  12  ----------------------------<  92  4.0   |  22  |  0.88    Ca    9.2      13 Jan 2025 15:30    TPro  6.7  /  Alb  4.1  /  TBili  0.3  /  DBili  x   /  AST  18  /  ALT  17  /  AlkPhos  52  01-13  A1C with Estimated Average Glucose Result: 5.4 % (01-13-25 @ 15:30)    Lipid Panel: Date/Time: 01-14-25 @ 08:22  Cholesterol, Serum: 209  LDL Cholesterol Calculated: 121  HDL Cholesterol, Serum: 56  Total Cholesterol/HDL Ration Measurement: --  Triglycerides, Serum: 230

## 2025-01-14 NOTE — DIETITIAN INITIAL EVALUATION ADULT - OTHER INFO
Patient is a 45 y/o female with PTSD, MDD, CASEY, no prior admissions, no SA/SIB, sees therapist, no prior med trials, who presented for inpatient admission for worsening anxiety and PTSD symptoms. Pt was seen in [facility] ED on 1/11, had agreed to admission but then became overwhelmed with the wait and requested to leave. Pt cites worsening depression, anxiety and PTSD symptoms in context of interpersonal stressors, ongoing divorce, death of friend. Tearful, emotionally numb, feels hopeless, ruminating thoughts, tired with poor sleep, nightmares and flashbacks. Feels unable to function, cannot cope in home environment. Pt requesting voluntary inpatient admission.     Met with patient on the unit today. Patient reports her appetite has been "sporadic" but admits to having decreased appetite and PO intake over the past month due to worsening anxiety. Takes magnesium and Multivitamin supplements at home, but not on any protein shakes. Reports her appetite remains poor but is slightly better, had PO intake ~50% at breakfast this AM and <50% at dinner last night due to dislike of the beef entree severed. NKFA reported. Does not consume beef/pork and prefers chicken (personal food pref). Menu options explored with patient today, food preferences taken and honored on CBoard. Denies chewing/swallowing difficulties on current diet. Denies GI distress (nausea/vomiting/diarrhea/ constipation) at this time. Writer encouraged adequate po intake as tolerated, patient verbalized understanding and amenable to trying chocolate Ensure Plus HP to optimize her nutrition intake.  Patient does not know her UBW (has not been taking her wts at home) but believes she has some weight loss recently due to decreased po; wt hx as per Vanna HUI 175lb (6/24/23); current adm wt 203lb (1/13/25). Will cont to monitor wt trend.

## 2025-01-14 NOTE — DIETITIAN INITIAL EVALUATION ADULT - ADD RECOMMEND
- May consider daily MVI for micronutrient coverage if no medical contraindications per MD's discretion.   - Encourage po intake as tolerated and honor food preferences PRN.  - Monitor PO intake/tolerance, weights, labs, BM's, and skin integrity.

## 2025-01-14 NOTE — DIETITIAN INITIAL EVALUATION ADULT - PERTINENT MEDS FT
MEDICATIONS  (STANDING):  nicotine -   7 mG/24Hr(s) Patch 1 Patch Transdermal daily  prazosin. 1 milliGRAM(s) Oral at bedtime  Tri-Lo-Litzy (norgestimate/eth est) 1 Tablet(s) 1 Tablet(s) Oral daily  venlafaxine XR 37.5 milliGRAM(s) Oral daily    MEDICATIONS  (PRN):  clonazePAM  Tablet 1 milliGRAM(s) Oral two times a day PRN anxiety  haloperidol     Tablet 5 milliGRAM(s) Oral every 6 hours PRN agitation  haloperidol    Injectable 5 milliGRAM(s) IntraMuscular once PRN severe agitation  LORazepam     Tablet 2 milliGRAM(s) Oral every 6 hours PRN anxiety from agitation  LORazepam   Injectable 2 milliGRAM(s) IntraMuscular once PRN anxiety from severe agitation  nicotine  Polacrilex Gum 2 milliGRAM(s) Oral every 4 hours PRN Smoking Cessation  traZODone 50 milliGRAM(s) Oral at bedtime PRN insomnia

## 2025-01-14 NOTE — BH INPATIENT PSYCHIATRY PROGRESS NOTE - NSBHMETABOLIC_PSY_ALL_CORE_FT
BMI: BMI (kg/m2): 32.8 (01-13-25 @ 15:59)  HbA1c: A1C with Estimated Average Glucose Result: 5.4 % (01-13-25 @ 15:30)    Glucose:   BP: --Vital Signs Last 24 Hrs  T(C): 36.6 (01-14-25 @ 08:38), Max: 36.6 (01-14-25 @ 08:38)  T(F): 97.9 (01-14-25 @ 08:38), Max: 97.9 (01-14-25 @ 08:38)  HR: --  BP: --  BP(mean): --  RR: --  SpO2: --    Orthostatic VS  01-14-25 @ 08:38  Lying BP: --/-- HR: --  Sitting BP: 132/86 HR: 103  Standing BP: 133/92 HR: 108  Site: --  Mode: --  Orthostatic VS  01-13-25 @ 15:59  Lying BP: --/-- HR: --  Sitting BP: 138/84 HR: 91  Standing BP: 142/92 HR: 100  Site: --  Mode: --    Lipid Panel: Date/Time: 01-14-25 @ 08:22  Cholesterol, Serum: 209  LDL Cholesterol Calculated: 121  HDL Cholesterol, Serum: 56  Total Cholesterol/HDL Ration Measurement: --  Triglycerides, Serum: 230

## 2025-01-15 PROCEDURE — 99232 SBSQ HOSP IP/OBS MODERATE 35: CPT

## 2025-01-15 RX ADMIN — Medication 1 TABLET(S): at 08:50

## 2025-01-15 RX ADMIN — VENLAFAXINE HYDROCHLORIDE 37.5 MILLIGRAM(S): 75 CAPSULE, EXTENDED RELEASE ORAL at 08:50

## 2025-01-15 RX ADMIN — PRAZOSIN HYDROCHLORIDE 1 MILLIGRAM(S): 5 CAPSULE ORAL at 20:30

## 2025-01-15 NOTE — BH TREATMENT PLAN - NSTXPOTSDINTERPR_PSY_ALL_CORE
Psychiatric Rehabilitation staff will continue to provide encouragement, support, psychotherapy, and psychoeducation to assist the Pt in the progression of her treatment goal and engagement with activities.

## 2025-01-15 NOTE — BH INPATIENT PSYCHIATRY PROGRESS NOTE - NSBHMETABOLIC_PSY_ALL_CORE_FT
BMI: BMI (kg/m2): 32.8 (01-13-25 @ 15:59)  HbA1c: A1C with Estimated Average Glucose Result: 5.4 % (01-13-25 @ 15:30)    Glucose:   BP: --Vital Signs Last 24 Hrs  T(C): 36.9 (01-15-25 @ 07:58), Max: 36.9 (01-15-25 @ 07:58)  T(F): 98.4 (01-15-25 @ 07:58), Max: 98.4 (01-15-25 @ 07:58)  HR: --  BP: --  BP(mean): --  RR: --  SpO2: --    Orthostatic VS  01-15-25 @ 07:58  Lying BP: --/-- HR: --  Sitting BP: 130/84 HR: 100  Standing BP: 119/85 HR: 108  Site: --  Mode: --  Orthostatic VS  01-14-25 @ 08:38  Lying BP: --/-- HR: --  Sitting BP: 132/86 HR: 103  Standing BP: 133/92 HR: 108  Site: --  Mode: --  Orthostatic VS  01-13-25 @ 15:59  Lying BP: --/-- HR: --  Sitting BP: 138/84 HR: 91  Standing BP: 142/92 HR: 100  Site: --  Mode: --    Lipid Panel: Date/Time: 01-14-25 @ 08:22  Cholesterol, Serum: 209  LDL Cholesterol Calculated: 121  HDL Cholesterol, Serum: 56  Total Cholesterol/HDL Ration Measurement: --  Triglycerides, Serum: 230   BMI: BMI (kg/m2): 32.8 (01-13-25 @ 15:59)  HbA1c: A1C with Estimated Average Glucose Result: 5.4 % (01-13-25 @ 15:30)    Glucose:   BP: --Vital Signs Last 24 Hrs  T(C): 36.9 (01-15-25 @ 07:58), Max: 36.9 (01-15-25 @ 07:58)  T(F): 98.4 (01-15-25 @ 07:58), Max: 98.4 (01-15-25 @ 07:58)  HR: --  BP: --  BP(mean): --  RR: --  SpO2: --    Orthostatic VS  01-15-25 @ 07:58  Lying BP: --/-- HR: --  Sitting BP: 130/84 HR: 100  Standing BP: 119/85 HR: 108  Site: --  Mode: --  Orthostatic VS  01-14-25 @ 08:38  Lying BP: --/-- HR: --  Sitting BP: 132/86 HR: 103  Standing BP: 133/92 HR: 108  Site: --  Mode: --    Lipid Panel: Date/Time: 01-14-25 @ 08:22  Cholesterol, Serum: 209  LDL Cholesterol Calculated: 121  HDL Cholesterol, Serum: 56  Total Cholesterol/HDL Ration Measurement: --  Triglycerides, Serum: 230

## 2025-01-15 NOTE — BH SOCIAL WORK INITIAL PSYCHOSOCIAL EVALUATION - NSCMSPTSTRENGTHS_PSY_ALL_CORE
Future/goal oriented/Intact employment/Supportive family Future/goal oriented/Intact employment/Strong support system/Supportive family

## 2025-01-15 NOTE — BH SOCIAL WORK INITIAL PSYCHOSOCIAL EVALUATION - OTHER PAST PSYCHIATRIC HISTORY (INCLUDE DETAILS REGARDING ONSET, COURSE OF ILLNESS, INPATIENT/OUTPATIENT TREATMENT)
As per Patient's brother - reports concern its more than the loss of her son. 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 c/o worsening anxiety and PTSD symptoms. Pt was also seen in [facility] ED on 1/11/[facility]25. Per chart review, Pt cited multiple stressors, including currently ongoing divorce, sudden death of a friend's , and ongoing trauma s/p son's death 8 years ago.     Collateral obtained by this Writer from Patient's therapist, Elvia and brother, Neal;  Elvia reports   As per Neal,

## 2025-01-15 NOTE — BH INPATIENT PSYCHIATRY PROGRESS NOTE - NSBHCHARTREVIEWVS_PSY_A_CORE FT
Vital Signs Last 24 Hrs  T(C): 36.9 (01-15-25 @ 07:58), Max: 36.9 (01-15-25 @ 07:58)  T(F): 98.4 (01-15-25 @ 07:58), Max: 98.4 (01-15-25 @ 07:58)  HR: --  BP: --  BP(mean): --  RR: --  SpO2: --    Orthostatic VS  01-15-25 @ 07:58  Lying BP: --/-- HR: --  Sitting BP: 130/84 HR: 100  Standing BP: 119/85 HR: 108  Site: --  Mode: --  Orthostatic VS  01-14-25 @ 08:38  Lying BP: --/-- HR: --  Sitting BP: 132/86 HR: 103  Standing BP: 133/92 HR: 108  Site: --  Mode: --  Orthostatic VS  01-13-25 @ 15:59  Lying BP: --/-- HR: --  Sitting BP: 138/84 HR: 91  Standing BP: 142/92 HR: 100  Site: --  Mode: --   Vital Signs Last 24 Hrs  T(C): 36.9 (01-15-25 @ 07:58), Max: 36.9 (01-15-25 @ 07:58)  T(F): 98.4 (01-15-25 @ 07:58), Max: 98.4 (01-15-25 @ 07:58)  HR: --  BP: --  BP(mean): --  RR: --  SpO2: --    Orthostatic VS  01-15-25 @ 07:58  Lying BP: --/-- HR: --  Sitting BP: 130/84 HR: 100  Standing BP: 119/85 HR: 108  Site: --  Mode: --  Orthostatic VS  01-14-25 @ 08:38  Lying BP: --/-- HR: --  Sitting BP: 132/86 HR: 103  Standing BP: 133/92 HR: 108  Site: --  Mode: --

## 2025-01-16 PROCEDURE — 99232 SBSQ HOSP IP/OBS MODERATE 35: CPT

## 2025-01-16 RX ORDER — ACETAMINOPHEN 80 MG/.8ML
650 SOLUTION/ DROPS ORAL EVERY 6 HOURS
Refills: 0 | Status: DISCONTINUED | OUTPATIENT
Start: 2025-01-16 | End: 2025-01-17

## 2025-01-16 RX ADMIN — NICOTINE POLACRILEX 2 MILLIGRAM(S): 4 LOZENGE ORAL at 17:21

## 2025-01-16 RX ADMIN — Medication 1 TABLET(S): at 08:36

## 2025-01-16 RX ADMIN — NICOTINE POLACRILEX 2 MILLIGRAM(S): 4 LOZENGE ORAL at 12:59

## 2025-01-16 RX ADMIN — Medication 5 MILLIGRAM(S): at 21:48

## 2025-01-16 RX ADMIN — ACETAMINOPHEN 650 MILLIGRAM(S): 80 SOLUTION/ DROPS ORAL at 16:00

## 2025-01-16 RX ADMIN — VENLAFAXINE HYDROCHLORIDE 37.5 MILLIGRAM(S): 75 CAPSULE, EXTENDED RELEASE ORAL at 08:37

## 2025-01-16 RX ADMIN — PRAZOSIN HYDROCHLORIDE 1 MILLIGRAM(S): 5 CAPSULE ORAL at 20:10

## 2025-01-16 NOTE — BH INPATIENT PSYCHIATRY PROGRESS NOTE - NSBHATTESTBILLING_PSY_A_CORE
30552-Lgnnqplkel OBS or IP - moderate complexity OR 35-49 mins
85215-Kefdewndhw OBS or IP - moderate complexity OR 35-49 mins
21939-Qxzrgytcba OBS or IP - moderate complexity OR 35-49 mins

## 2025-01-16 NOTE — BH INPATIENT PSYCHIATRY DISCHARGE NOTE - NSDCMRMEDTOKEN_GEN_ALL_CORE_FT
Metamucil 3.4 g/3.7 g oral powder for reconstitution: orally once a day  Vitamin D3 25 mcg (1000 intl units) oral tablet: 1 tab(s) orally once a day   Multiple Vitamins with Minerals oral tablet: 1 tab(s) orally once a day  nicotine 7 mg/24 hr transdermal film, extended release: 1 patch transdermal once a day  prazosin 1 mg oral capsule: 1 cap(s) orally once a day (at bedtime)  venlafaxine 37.5 mg oral capsule, extended release: 1 cap(s) orally once a day

## 2025-01-16 NOTE — BH INPATIENT PSYCHIATRY PROGRESS NOTE - NSBHMSETHTCONTENT_PSY_A_CORE
Please advise on alternative for Budesonide 9mg. PA denied, along with appeal process.
Preoccupations
Unremarkable
Unremarkable

## 2025-01-16 NOTE — BH INPATIENT PSYCHIATRY PROGRESS NOTE - NSBHMETABOLIC_PSY_ALL_CORE_FT
BMI: BMI (kg/m2): 32.8 (01-13-25 @ 15:59)  HbA1c: A1C with Estimated Average Glucose Result: 5.4 % (01-13-25 @ 15:30)    Glucose:   BP: --Vital Signs Last 24 Hrs  T(C): 36.8 (01-16-25 @ 08:32), Max: 36.8 (01-16-25 @ 08:32)  T(F): 98.2 (01-16-25 @ 08:32), Max: 98.2 (01-16-25 @ 08:32)  HR: --  BP: --  BP(mean): --  RR: --  SpO2: --    Orthostatic VS  01-16-25 @ 08:32  Lying BP: --/-- HR: --  Sitting BP: 133/83 HR: 89  Standing BP: 124/89 HR: 92  Site: --  Mode: --  Orthostatic VS  01-15-25 @ 07:58  Lying BP: --/-- HR: --  Sitting BP: 130/84 HR: 100  Standing BP: 119/85 HR: 108  Site: --  Mode: --    Lipid Panel: Date/Time: 01-14-25 @ 08:22  Cholesterol, Serum: 209  LDL Cholesterol Calculated: 121  HDL Cholesterol, Serum: 56  Total Cholesterol/HDL Ration Measurement: --  Triglycerides, Serum: 230   BMI: BMI (kg/m2): 32.8 (01-13-25 @ 15:59)  HbA1c: A1C with Estimated Average Glucose Result: 5.4 % (01-13-25 @ 15:30)    Glucose:   BP: 154/94 (01-16-25 @ 21:21) (153/110 - 154/94)Vital Signs Last 24 Hrs  T(C): 36.7 (01-16-25 @ 20:18), Max: 36.8 (01-16-25 @ 08:32)  T(F): 98 (01-16-25 @ 20:18), Max: 98.2 (01-16-25 @ 08:32)  HR: 82 (01-16-25 @ 21:21) (81 - 82)  BP: 154/94 (01-16-25 @ 21:21) (153/110 - 154/94)  BP(mean): --  RR: 18 (01-16-25 @ 21:21) (18 - 18)  SpO2: 98% (01-16-25 @ 21:21) (98% - 98%)    Orthostatic VS  01-16-25 @ 08:32  Lying BP: --/-- HR: --  Sitting BP: 133/83 HR: 89  Standing BP: 124/89 HR: 92  Site: --  Mode: --  Orthostatic VS  01-15-25 @ 07:58  Lying BP: --/-- HR: --  Sitting BP: 130/84 HR: 100  Standing BP: 119/85 HR: 108  Site: --  Mode: --    Lipid Panel: Date/Time: 01-14-25 @ 08:22  Cholesterol, Serum: 209  LDL Cholesterol Calculated: 121  HDL Cholesterol, Serum: 56  Total Cholesterol/HDL Ration Measurement: --  Triglycerides, Serum: 230

## 2025-01-16 NOTE — BH INPATIENT PSYCHIATRY PROGRESS NOTE - NSBHFUPINTERVALHXFT_PSY_A_CORE
Pt assessed for PTSD and MDD. Chart reviewed and case discussed with treatment team. No interval events reported overnight. Pt advocating for discharge. Attempted to discuss PHP vs PACE, but pt states she did not think the treatment team understands what she is going through being here. Pt states that the prazosin has bee helpful with "turning off my brain and making me calmer at night" and "I told my mother this morning that my blood pressure was much better!" Pt states that she agrees to take FMLA so she can "surrender the right way" and attend outpt treatment. Pt states that she is missing her son's baseball game today and "I have never missed any of my kids' games before." Pt states that she did not feel she was getting anything out of being here. Discussed medication management and arranging supportive discharge planning. Pt repeated that she didn't think treatment team understood what she was going through. This writer began  to talk to pt about anxiety and not feeling in control of things. Pt agrees that she feels she needs to control everything and she is not able to do these things here. Pt's feelings were validated and pt was able to make the connection that she did not feel in control when her child passed away and when she feels she is unable to control a situation, she becomes more anxious. Pt gradually able to state that she cannot control everything and that she needs to develop more coping skills other than keeping busy to help with her anxieties. Pt eventually agreed to allow treatment team to set up appropriate aftercare before discharge. 
Pt assessed for PTSD and MDD. Chart reviewed and case discussed with treatment team. No interval events reported overnight. Pt states she slept well overnight with prazosin. Pt states that she does not feel any changes with start of effexor yet (only took 2nd dose today), but she is optimistic that she will feel better soon. Pt states that she is trying to focus on self care while here, but states she has significant stressors when she is discharged. Pt states that she has been attempting to divorce her  for 2 years now and though he moved out briefly, he has since moved back into the family home. Pt states she tells him weekly to move out as she no longer wishes to live with him, but he refuses  to leave. Pt states that she dreads 3PM, because she would leave work knowing that he will be in their home. Pt states she tries to avoid being home when her  is there and she has "begged him to leave" multiple times. Discussed coping mechanisms and provided support. Discussed PHP and pt states she would like this, but is concerned that she would remain hospitalized until an intake appt is obtained; pt does not wish to stay past 1 week on the unit and wishes to return to her children. 
Pt assessed for PTSD and MDD. Chart reviewed and case discussed with treatment team. No interval events reported overnight. Pt assessed by this writer with Dr. Huff present. Pt states the prazosin "knocked" her out and she had a solid sleep overnight, the first in 8 years, "I didn't even get up for the bathroom, thank you!" Pt becomes tearful speaking about downtime on the unit. Pt states that she normally keeps herself so busy that she does not sit in her thoughts and now that she cannot do this on the unit, she is not liking how her thoughts are making her feel. Discussed coping mechanisms and educated that downtime on the unit should be utilized to focus on these skills learned in group therapy and on her own care. Discussed how she may request her children to visit during hospitalization. Pt denies SIIP. Pt provided contact for her outpt therapist Elvia Sevilla [phone] and [phone]    Elvia called and states that she has been working on/off with the pt and her  for 7-8 years, pt would come in to work on one thing the stop for awhile only to return, she worked with the family after Christopher's death x 1 year; total sessions "maybe 27" over the years. Pt is very resilient, adopting Berto after Christopher's death. Pt in midst of divorce, but she was having problems with her  even before Christopher . Pt refused meds, eventually got lexapor from PCP but did not follow through with meds, pt has unstable moods but she does not think she is bipolar, "definitely has a mood disorder of some kind." Pt has had some risky behaviors, getting involved with other people in the midst of her divorce despite knowing this would not help her situation. Elvia states that she believes the pt's most recent paramour ghosted the pt last week and this "pushed her over the top." Pt had an eating disorder in college. Pt has a supportive family. Pt minimizing ETOH use and Elvia feels pt is self medicating, drinking until she is drunk. 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 Pt avoids her trauma and takes care of other people's needs before her own

## 2025-01-16 NOTE — BH INPATIENT PSYCHIATRY DISCHARGE NOTE - NSBHDCHANDOFFNOFT_PSY_A_CORE
pt left on 3DL  pt left on 3DL and declined referrals - pt states she will return to her outpt therapist Elvia

## 2025-01-16 NOTE — BH INPATIENT PSYCHIATRY PROGRESS NOTE - ATTENDING COMMENTS
Care was discussed and reviewed in the interdisciplinary treatment team.  I, Joanna Sanchez MD, have reviewed and verified the documentation.  I independently performed the documented medical decision making. Care was discussed and reviewed in the interdisciplinary treatment team.  I, Joanna Sanchez MD, have reviewed and verified the documentation.  I independently performed the documented medical decision making.    Patient seemed to be more emotional today. She has been crying more and is more upset. Patient said that she doesn't know why this is happening. She has been thinking and came to terms that she needs to take time for her self. She is more open on going to PHP and taking FMLA. She want to be out of the hospital and with her kids but understands that she needs to take care of her self. Continues to deny SI and has been able to contract for safety.

## 2025-01-16 NOTE — BH INPATIENT PSYCHIATRY DISCHARGE NOTE - HOSPITAL COURSE
Pt seen by this writer on 25: "Pt is a 43y/o F with PTSD, MDD, CASEY, no prior admissions, no SA/SIB, sees therapist, no prior med trials, who presented for inpatient admission for worsening anxiety and PTSD symptoms. Pt was seen in [facility] ED on , had agreed to admission but then became overwhelmed with the wait and requested to leave. Pt cites worsening depression, anxiety and PTSD symptoms in context of interpersonal stressors, ongoing divorce, death of friend. Tearful, emotionally numb, feels hopeless, ruminating thoughts, tired with poor sleep, nightmares and flashbacks. Feels unable to function, cannot cope in home environment. Pt requesting voluntary inpatient admission.     On 2W, pt seen by this writer and Dr. Huff. 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 Pt reports increased anxiety, global insomnia x 8 years (sleeps an hour per day), flashbacks, depressed, exhausted, isolating, poor motivation, anergia, anhedonia. Recent stressors include her best friend's  suddenly passing away and this reminded pt of how her son passed away, divorce, arguing with her family, not seeing her children for the holidays as a family, having a dinner with the families from [facility] that were having their first holidays without their  children. Pt denies A/VH or paranoia. Pt denies SIIP and agrees to come to staff immediately with any safety concerns. "I don't believe in tomorrows." Pt reports trying klonopin, xanax, melatonin in the past with poor results. Discussed medications with pt. Pt states she "surrenders" because she has never wanted psych meds, but feels she needs them now. Pt agrees to start prazosin 1 mg PO QHS and effexor 37.5 mg PO QD. Pt provided with medication education including but not limited possible side effects like sedation, low BP, GI upset, etc. Pt verbalized understanding. Pt with therapist Nohemi (doesn't have number on her, but will obtain for staff) and gives verbal consent for her brother Neal [phone]    PMH: ablation - she has her menstrual cycle but does not bleed  Allergies: denies  Substances: social ETOH "    On the unit, pt outwardly bright and social, but during one on one assessment, pt was tearful, feeling she is not able to control things, anxious. With start of prazosin 1 mg PO QHS, pt reported being able to sleep for the first time in 8 years.     ____________    Pt provided with medication education including, but not limited to, possible side effects like sedation, dizziness, change in liver function levels, weight gain, N/V, GI upset, EPS, TD, NMS, and metabolic changes; pt verbalized understanding. Pt instructed not to drive or use hazardous machinery or materials while on this medication; pt verbalized understanding. On day of discharge, pt denied SIIP, HIIP, A/VH, IOR, paranoia, thought insertion/withdrawal/broadcasting, magical thinking, special abilities, mind reading, Alevism preoccupation, sxs of deanne, depression, or anxiety. Pt educated on use of 911 in cases of emergency and to go to the nearest ED if feeling unsafe in the community. Pt verbalized understanding. Pt provided with numbers for Suicide Prevention and [facility] and educated on their use; pt verbalized understanding. Pt was educated on the adverse effects these medications may have on a fetus and provided with birth control education; pt verbalized understanding    Pt was discharged on: ______________ Pt seen by this writer on 25: "Pt is a 43y/o F with PTSD, MDD, CASEY, no prior admissions, no SA/SIB, sees therapist, no prior med trials, who presented for inpatient admission for worsening anxiety and PTSD symptoms. Pt was seen in [facility] ED on , had agreed to admission but then became overwhelmed with the wait and requested to leave. Pt cites worsening depression, anxiety and PTSD symptoms in context of interpersonal stressors, ongoing divorce, death of friend. Tearful, emotionally numb, feels hopeless, ruminating thoughts, tired with poor sleep, nightmares and flashbacks. Feels unable to function, cannot cope in home environment. Pt requesting voluntary inpatient admission.     On 2W, pt seen by this writer and Dr. Huff. 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 Pt reports increased anxiety, global insomnia x 8 years (sleeps an hour per day), flashbacks, depressed, exhausted, isolating, poor motivation, anergia, anhedonia. Recent stressors include her best friend's  suddenly passing away and this reminded pt of how her son passed away, divorce, arguing with her family, not seeing her children for the holidays as a family, having a dinner with the families from [facility] that were having their first holidays without their  children. Pt denies A/VH or paranoia. Pt denies SIIP and agrees to come to staff immediately with any safety concerns. "I don't believe in tomorrows." Pt reports trying klonopin, xanax, melatonin in the past with poor results. Discussed medications with pt. Pt states she "surrenders" because she has never wanted psych meds, but feels she needs them now. Pt agrees to start prazosin 1 mg PO QHS and effexor 37.5 mg PO QD. Pt provided with medication education including but not limited possible side effects like sedation, low BP, GI upset, etc. Pt verbalized understanding. Pt with therapist Nohemi (doesn't have number on her, but will obtain for staff) and gives verbal consent for her brother Neal [phone]    PMH: ablation - she has her menstrual cycle but does not bleed  Allergies: denies  Substances: social ETOH "    On the unit, pt outwardly bright and social, but during one on one assessment, pt was tearful, feeling she is not able to control things, anxious. With start of prazosin 1 mg PO QHS, pt reported being able to sleep for the first time in 8 years. Pt also started on effexor 37/5 mg PO QD for depression. Pt felt she needed individual therapy and there was no psychologist on the unit for this. Psychology was outreached, but did not have anyone available for individual therapy. Pt submitted a 3DL on 25. Pt denied SIIP and cited her children as her protective factors. Pt provided with medication education including, but not limited to, possible side effects like sedation, dizziness, change in liver function levels, weight gain, N/V, GI upset, EPS, TD, NMS, and metabolic changes; pt verbalized understanding. Pt instructed not to drive or use hazardous machinery or materials while on this medication; pt verbalized understanding. On day of discharge, pt denied SIIP, HIIP, A/VH, IOR, paranoia, thought insertion/withdrawal/broadcasting, magical thinking, special abilities, mind reading, Restorationist preoccupation, sxs of deanne, depression, or anxiety. Pt educated on use of 911 in cases of emergency and to go to the nearest ED if feeling unsafe in the community. Pt verbalized understanding. Pt provided with numbers for Suicide Prevention and [facility] and educated on their use; pt verbalized understanding. Pt was educated on the adverse effects these medications may have on a fetus and provided with birth control education; pt verbalized understanding    Pt was discharged on: prazosin 1 mg PO QHS, effexor 37.5 mg PO QD, multivitamin 1 tab PO QD, nicotine patch 7 mg transdermal QD, Pt seen by this writer on 25: "Pt is a 43y/o F with PTSD, MDD, CASEY, no prior admissions, no SA/SIB, sees therapist, no prior med trials, who presented for inpatient admission for worsening anxiety and PTSD symptoms. Pt was seen in [facility] ED on , had agreed to admission but then became overwhelmed with the wait and requested to leave. Pt cites worsening depression, anxiety and PTSD symptoms in context of interpersonal stressors, ongoing divorce, death of friend. Tearful, emotionally numb, feels hopeless, ruminating thoughts, tired with poor sleep, nightmares and flashbacks. Feels unable to function, cannot cope in home environment. Pt requesting voluntary inpatient admission.     On 2W, pt seen by this writer and Dr. Huff. 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 Pt reports increased anxiety, global insomnia x 8 years (sleeps an hour per day), flashbacks, depressed, exhausted, isolating, poor motivation, anergia, anhedonia. Recent stressors include her best friend's  suddenly passing away and this reminded pt of how her son passed away, divorce, arguing with her family, not seeing her children for the holidays as a family, having a dinner with the families from [facility] that were having their first holidays without their  children. Pt denies A/VH or paranoia. Pt denies SIIP and agrees to come to staff immediately with any safety concerns. "I don't believe in tomorrows." Pt reports trying klonopin, xanax, melatonin in the past with poor results. Discussed medications with pt. Pt states she "surrenders" because she has never wanted psych meds, but feels she needs them now. Pt agrees to start prazosin 1 mg PO QHS and effexor 37.5 mg PO QD. Pt provided with medication education including but not limited possible side effects like sedation, low BP, GI upset, etc. Pt verbalized understanding. Pt with therapist Nohemi (doesn't have number on her, but will obtain for staff) and gives verbal consent for her brother Neal [phone]    PMH: ablation - she has her menstrual cycle but does not bleed  Allergies: denies  Substances: social ETOH "    On the unit, pt outwardly bright and social, but during one on one assessment, pt was tearful, feeling she is not able to control things, anxious. With start of prazosin 1 mg PO QHS, pt reported being able to sleep for the first time in 8 years. Pt also started on effexor 37/5 mg PO QD for depression. Pt felt she needed individual therapy and there was no psychologist on the unit for this. Psychology was outreached, but did not have anyone available for individual therapy. Pt submitted a 3DL on 25. Pt denied SIIP and cited her children as her protective factors. Pt provided with medication education including, but not limited to, possible side effects like sedation, dizziness, change in liver function levels, weight gain, N/V, GI upset, EPS, TD, NMS, and metabolic changes; pt verbalized understanding. Pt instructed not to drive or use hazardous machinery or materials while on this medication; pt verbalized understanding. On day of discharge, pt denied SIIP, HIIP, A/VH, IOR, paranoia, thought insertion/withdrawal/broadcasting, magical thinking, special abilities, mind reading, Cheondoism preoccupation, sxs of deanne, depression, or anxiety. Pt educated on use of 911 in cases of emergency and to go to the nearest ED if feeling unsafe in the community. Pt verbalized understanding. Pt provided with numbers for Suicide Prevention and [facility] and educated on their use; pt verbalized understanding. Pt was educated on the adverse effects these medications may have on a fetus and provided with birth control education; pt verbalized understanding    Pt was discharged on: prazosin 1 mg PO QHS, effexor 37.5 mg PO QD, multivitamin 1 tab PO QD, nicotine patch 7 mg transdermal QD (All sent to [facility] on [address] in [facility] per pt request). Pt's klonopin 1 mg PO PRN BID was returned to her upon discharge from pharmacy storage. Pt instructed to follow up elevated BPs overnight with her primary care physician. Pt discharged on 3DL submitted this AM.

## 2025-01-16 NOTE — BH INPATIENT PSYCHIATRY PROGRESS NOTE - PRN MEDS
MEDICATIONS  (PRN):  clonazePAM  Tablet 1 milliGRAM(s) Oral two times a day PRN anxiety  haloperidol     Tablet 5 milliGRAM(s) Oral every 6 hours PRN agitation  haloperidol    Injectable 5 milliGRAM(s) IntraMuscular once PRN severe agitation  LORazepam     Tablet 2 milliGRAM(s) Oral every 6 hours PRN anxiety from agitation  LORazepam   Injectable 2 milliGRAM(s) IntraMuscular once PRN anxiety from severe agitation  nicotine  Polacrilex Gum 2 milliGRAM(s) Oral every 4 hours PRN Smoking Cessation  traZODone 50 milliGRAM(s) Oral at bedtime PRN insomnia   MEDICATIONS  (PRN):  acetaminophen     Tablet .. 650 milliGRAM(s) Oral every 6 hours PRN Moderate Pain (4 - 6)  clonazePAM  Tablet 1 milliGRAM(s) Oral two times a day PRN anxiety  haloperidol     Tablet 5 milliGRAM(s) Oral every 6 hours PRN agitation  haloperidol    Injectable 5 milliGRAM(s) IntraMuscular once PRN severe agitation  LORazepam     Tablet 2 milliGRAM(s) Oral every 6 hours PRN anxiety from agitation  LORazepam   Injectable 2 milliGRAM(s) IntraMuscular once PRN anxiety from severe agitation  nicotine  Polacrilex Gum 2 milliGRAM(s) Oral every 4 hours PRN Smoking Cessation  traZODone 50 milliGRAM(s) Oral at bedtime PRN insomnia

## 2025-01-16 NOTE — BH INPATIENT PSYCHIATRY PROGRESS NOTE - NSBHTIMEACTIVITIESPERFORMED_PSY_A_CORE
assessment, medication management and education, psychoed, therapy, dispo planning 

## 2025-01-16 NOTE — BH INPATIENT PSYCHIATRY PROGRESS NOTE - NSBHCONSBHPROVDETAILS_PSY_A_CORE  FT
outpt therapist Elvia Sevilla [phone] and [phone] - pls see note on 1/14/25
outpt therapist Elvia Sevilla [phone] and [phone] - pls see note on 1/14/25
outpt therapist Elvia Sevilla [phone] and [phone] - pls see note on 1/14/25

## 2025-01-16 NOTE — BH INPATIENT PSYCHIATRY DISCHARGE NOTE - ATTENDING DISCHARGE PHYSICAL EXAMINATION:
Care was discussed and reviewed in the interdisciplinary treatment team.  I, Joanna Sanchez MD, have reviewed and verified the documentation.  I independently performed the documented medical decision making.      Patient was seen and evaluated today by the NP and this writer. Patient submitted a 3DL today saying that she feels she is not getting what she needs and we are not listening to her. Patient was educated about the 3DL and informed we will not be able to secure follow appointments at [facility] or [place]. Patient verbalized good understanding and agreed to leave the hospital today. Patient is denying any SI and has been able to contract for safety. Denies any HI or hallucinations and no delusions have been elicited. The patient has been educated about the safety plan and understands that if she feels like harming herself or others, she can call 911 or go to any ER. During the hospital stay, the patient has not demonstrated any aggressive or self-injurious behaviors, and this has been consistent with my observations and the observations of the staff.

## 2025-01-16 NOTE — BH INPATIENT PSYCHIATRY DISCHARGE NOTE - NSBHFUPINTERVALHXFT_PSY_A_CORE
Discharge Progress Note Date and Time: 01-17-25 @ 14:48  Pt assessed for anxiety and PTSD. Staff report pt with an episode of elevated BP overnight, seen by on call and provided with norvasc 5 mg PO, was given prazosin early, and trazodone PRN. Chart reviewed and case discussed with treatment team - team did not have any safety concerns regarding discharge today. Pt submitted a 3DL this AM. Pt seen with Dr. Huff present. Pt associates her elevated BP last night to frustration and not feeling her needs were being met on the unit, "I was told I would get individual therapy and that wasn't true." Pt states she does not wish to stay the weekend or wait for PHP referral to be completed. Pt adamantly denies SIIP, HIIP, A/VH, or paranoia. Pt showed team pictures of her children and family she had taped to her wall and cited her children as her protective factors. Pt states she will obtain outpt services for herself and states she has already missed 3 sessions with her outpt therapist Elvia. Pt gave [facility] on [address] in [facility] as her pharmacy and her Rx were sent there. Pt educated on the use of 911, 988, the crisis clinic, and going to the nearest ED if safety concerns should arise in the community; pt verbalized understanding. Pt was discharged to her  [facility] care today.

## 2025-01-16 NOTE — BH INPATIENT PSYCHIATRY DISCHARGE NOTE - NSDCRECOMMENDMEDICALFT_PSY_ALL_CORE
please follow up with your primary care for elevated blood pressure follow up  please follow up with your primary care for elevated blood pressure

## 2025-01-16 NOTE — BH INPATIENT PSYCHIATRY DISCHARGE NOTE - NSBHASSESSSUMMFT_PSY_ALL_CORE
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 Pt was also seen in [facility] ED on 1/11/2025. Per chart review, Pt cited multiple stressors, including currently ongoing divorce, sudden death of a friend's , and ongoing trauma s/p son's death 8 years ago.   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 Pt was also seen in [facility] ED on 1/11/2025. Per chart review, Pt cited multiple stressors, including currently ongoing divorce, sudden death of a friend's , and ongoing trauma s/p son's death 8 years ago.     Pt assessed for anxiety and PTSD. Staff report pt with an episode of elevated BP overnight, seen by on call and provided with norvasc 5 mg PO, was given prazosin early, and trazodone PRN. Chart reviewed and case discussed with treatment team - team did not have any safety concerns regarding discharge today. Pt submitted a 3DL this AM. Pt seen with Dr. Huff present. Pt associates her elevated BP last night to frustration and not feeling her needs were being met on the unit, "I was told I would get individual therapy and that wasn't true." Pt states she does not wish to stay the weekend or wait for PHP referral to be completed. Pt adamantly denies SIIP, HIIP, A/VH, or paranoia. Pt showed team pictures of her children and family she had taped to her wall and cited her children as her protective factors. Pt states she will obtain outpt services for herself and states she has already missed 3 sessions with her outpt therapist Elvia. Pt gave [facility] [address] in [facility] as her pharmacy and her Rx were sent there. Pt educated on the use of 911, 988, the crisis clinic, and going to the nearest ED if safety concerns should arise in the community; pt verbalized understanding. Pt was discharged to her  [facility] care today.

## 2025-01-16 NOTE — BH INPATIENT PSYCHIATRY PROGRESS NOTE - NSTXDCOTHRGOAL_PSY_ALL_CORE
Patient will report improved mood, insight into coping skills for symptoms, and report no suicidal ideations, intentions or plans.
Patient will report improved mood, insight into coping skills for symptoms, and report no suicidal ideations, intentions or plans.

## 2025-01-16 NOTE — BH INPATIENT PSYCHIATRY PROGRESS NOTE - NSCGISEVERILLNESS_PSY_ALL_CORE
6 = Severely ill - disruptive pathology, behavior and function are frequently influenced by symptoms, may require assistance from others

## 2025-01-16 NOTE — BH INPATIENT PSYCHIATRY PROGRESS NOTE - NSBHATTESTTYPEVISIT_PSY_A_CORE
On-site Attending supervising TASHI (99XXX codes)

## 2025-01-16 NOTE — BH INPATIENT PSYCHIATRY DISCHARGE NOTE - NSBHDCMEDICALFT_PSY_A_CORE
none  pt with an episode of elevated BP and was given norvasc 5 mg PO on 1/16/25 - pt to follow up with her PCP

## 2025-01-16 NOTE — BH INPATIENT PSYCHIATRY PROGRESS NOTE - NSTXDEPRESINTERMD_PSY_ALL_CORE
med management, therapy, psychoed 

## 2025-01-16 NOTE — BH INPATIENT PSYCHIATRY PROGRESS NOTE - NSBHASSESSSUMMFT_PSY_ALL_CORE
Pt is a 43y/o F with PTSD, MDD, CAESY, no prior admissions, no SA/SIB, sees therapist, no prior med trials, who presented for inpatient admission for worsening anxiety and PTSD symptoms. Pt was seen in [facility] ED on 1/11, had agreed to admission but then became overwhelmed with the wait and requested to leave. Pt cites worsening depression, anxiety and PTSD symptoms in context of interpersonal stressors, ongoing divorce, death of friend. Tearful, emotionally numb, feels hopeless, ruminating thoughts, tired with poor sleep, nightmares and flashbacks. Feels unable to function, cannot cope in home environment. Pt requesting voluntary inpatient admission.     Today, pt tearful, states she is missing her children's life being in the hospital, feels she is not getting anything out of being hospitalized though she acknowledges medication management and observation is necessary, discussed not being abl e to control things while hospitalized and pt acknowledges that this is causing her a lot of discomfort. Dipso PHP v PACE     PLAN:   -voluntary admission to 2W  -start prazosin 1 mg PO QHS  -start effexor 37.5 mg PO QD  -start trazodone 50 mg PO PRN for insomnia  -c/w outpt klonopin 1 mg PO PRN BID  -PO/IM haldol and ativan for agitation   -obtain collaterals  -request psychology for individual therapy   -nicotine patch and gum for NRT   -encourage I/M/G therapy
Pt is a 45y/o F with PTSD, MDD, CASEY, no prior admissions, no SA/SIB, sees therapist, no prior med trials, who presented for inpatient admission for worsening anxiety and PTSD symptoms. Pt was seen in [facility] ED on 1/11, had agreed to admission but then became overwhelmed with the wait and requested to leave. Pt cites worsening depression, anxiety and PTSD symptoms in context of interpersonal stressors, ongoing divorce, death of friend. Tearful, emotionally numb, feels hopeless, ruminating thoughts, tired with poor sleep, nightmares and flashbacks. Feels unable to function, cannot cope in home environment. Pt requesting voluntary inpatient admission.     Today, pt tearful, states that she is used to being busy and being hospitalized is giving her time to think about her own trauma and this is upsetting, states that she was finally able  to have a good solid sleep overnight with the prazosin. Individual therapy requested from psychology, but they informed 2W that they do not have any openings for individual therapy at this time.     PLAN:   -voluntary admission to 2W  -start prazosin 1 mg PO QHS  -start effexor 37.5 mg PO QD  -start trazodone 50 mg PO PRN for insomnia  -c/w outpt klonopin 1 mg PO PRN BID  -PO/IM haldol and ativan for agitation   -obtain collaterals  -request psychology for individual therapy   -nicotine patch and gum for NRT   -encourage I/M/G therapy
Pt is a 43y/o F with PTSD, MDD, CASEY, no prior admissions, no SA/SIB, sees therapist, no prior med trials, who presented for inpatient admission for worsening anxiety and PTSD symptoms. Pt was seen in [facility] ED on 1/11, had agreed to admission but then became overwhelmed with the wait and requested to leave. Pt cites worsening depression, anxiety and PTSD symptoms in context of interpersonal stressors, ongoing divorce, death of friend. Tearful, emotionally numb, feels hopeless, ruminating thoughts, tired with poor sleep, nightmares and flashbacks. Feels unable to function, cannot cope in home environment. Pt requesting voluntary inpatient admission.     Today, pt tearful, states that she is trying to focus on self care but thinks of her stressors in the community including her  still living in the family home despite her asking him to leave, states that she was finally able  to have a good solid sleep overnight with the prazosin. Individual therapy requested from psychology, but they informed 2W that they do not have any openings for individual therapy at this time. Pt informed of this. Pt is amenable to PHP referral but does not think she can wait on the unit for the intake appt as she would like to return to her children.     PLAN:   -voluntary admission to 2W  -start prazosin 1 mg PO QHS  -start effexor 37.5 mg PO QD  -start trazodone 50 mg PO PRN for insomnia  -c/w outpt klonopin 1 mg PO PRN BID  -PO/IM haldol and ativan for agitation   -obtain collaterals  -request psychology for individual therapy   -nicotine patch and gum for NRT   -encourage I/M/G therapy

## 2025-01-16 NOTE — BH INPATIENT PSYCHIATRY PROGRESS NOTE - CURRENT MEDICATION
MEDICATIONS  (STANDING):  multivitamin/minerals 1 Tablet(s) Oral daily  nicotine -   7 mG/24Hr(s) Patch 1 Patch Transdermal daily  prazosin. 1 milliGRAM(s) Oral at bedtime  Tri-Lo-Litzy (norgestimate/eth est) 1 Tablet(s) 1 Tablet(s) Oral daily  venlafaxine XR 37.5 milliGRAM(s) Oral daily    MEDICATIONS  (PRN):  clonazePAM  Tablet 1 milliGRAM(s) Oral two times a day PRN anxiety  haloperidol     Tablet 5 milliGRAM(s) Oral every 6 hours PRN agitation  haloperidol    Injectable 5 milliGRAM(s) IntraMuscular once PRN severe agitation  LORazepam     Tablet 2 milliGRAM(s) Oral every 6 hours PRN anxiety from agitation  LORazepam   Injectable 2 milliGRAM(s) IntraMuscular once PRN anxiety from severe agitation  nicotine  Polacrilex Gum 2 milliGRAM(s) Oral every 4 hours PRN Smoking Cessation  traZODone 50 milliGRAM(s) Oral at bedtime PRN insomnia   MEDICATIONS  (STANDING):  amLODIPine   Tablet 5 milliGRAM(s) Oral once  multivitamin/minerals 1 Tablet(s) Oral daily  nicotine -   7 mG/24Hr(s) Patch 1 Patch Transdermal daily  prazosin. 1 milliGRAM(s) Oral at bedtime  Tri-Lo-Litzy (norgestimate/eth est) 1 Tablet(s) 1 Tablet(s) Oral daily  venlafaxine XR 37.5 milliGRAM(s) Oral daily    MEDICATIONS  (PRN):  acetaminophen     Tablet .. 650 milliGRAM(s) Oral every 6 hours PRN Moderate Pain (4 - 6)  clonazePAM  Tablet 1 milliGRAM(s) Oral two times a day PRN anxiety  haloperidol     Tablet 5 milliGRAM(s) Oral every 6 hours PRN agitation  haloperidol    Injectable 5 milliGRAM(s) IntraMuscular once PRN severe agitation  LORazepam     Tablet 2 milliGRAM(s) Oral every 6 hours PRN anxiety from agitation  LORazepam   Injectable 2 milliGRAM(s) IntraMuscular once PRN anxiety from severe agitation  nicotine  Polacrilex Gum 2 milliGRAM(s) Oral every 4 hours PRN Smoking Cessation  traZODone 50 milliGRAM(s) Oral at bedtime PRN insomnia

## 2025-01-16 NOTE — BH INPATIENT PSYCHIATRY DISCHARGE NOTE - NSDCCPCAREPLAN_GEN_ALL_CORE_FT
PRINCIPAL DISCHARGE DIAGNOSIS  Diagnosis: MDD (major depressive disorder)  Assessment and Plan of Treatment:       SECONDARY DISCHARGE DIAGNOSES  Diagnosis: Post traumatic stress disorder (PTSD)  Assessment and Plan of Treatment:

## 2025-01-16 NOTE — BH INPATIENT PSYCHIATRY DISCHARGE NOTE - HPI (INCLUDE ILLNESS QUALITY, SEVERITY, DURATION, TIMING, CONTEXT, MODIFYING FACTORS, ASSOCIATED SIGNS AND SYMPTOMS)
Per Crisis Clinic note: "Patient is a 45y/o F with PTSD, MDD, CASEY, no prior hospitalizations, no SA/SIB, who presented for inpatient admission for worsening anxiety and PTSD symptoms. Pt was seen in [facility] ED on 1/11/2025, chart reviewed. Pt cited multiple stressors, including currently ongoing divorce, and sudden death of a friend’s . Stated that she has been fighting battles of the last 8 years; feels anxious, endorsed nightmares and flashback, and panic attacks and fear of going to sleep. Pt with significant trauma hx of traumatic birth and death of infant son in 2016. Pt founded a bereavement group at [facility], supports other parents, has been very active lately. Cited difficult relationship with ; they are  but still living together. Reports depressed mood, poor sleep, low appetite, low energy, poor concentration, low motivation and decreased interest, feels hopeless. 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 Is currently in therapy, is on Klonopin PRN rx by PCP. Pt initially agreed to voluntary admission, then stated that she would prefer to return home as she had been waiting for a long time. Pt was cleared by telepsych. Pt’s therapist was also contact from ED, had encouraged pt to come in d/t increased social stressors and encouraged pt to seek voluntary admission. Did not report imminent safety concerns re: SI/HI.   On interview today, pt states that she is not doing well, has not been functioning well, and feels she needs inpatient admission. She has been working with her therapist on and off since 4/2016; for the last 19 months has been seeing her weekly. Finds it helpful, but symptoms have been getting worse in context of recent stressors. Feels like she is “done,” feels hopeless. Has difficulty trusting others or being vulnerable, does not open up with most people about what she is feeling. Is “going through the motions” but feels emotionally numb.   Reports that her best friend’s  passed away on 12/12/24, in the same manner that her son passed in 2016, which triggered a lot of emotions. Has had interpersonal issues with family members. Cites coping skills of driving; this has not been helping recently. Last Saturday,  made antagonistic comment which upset her. Sat in her car for several hours crying. Adamantly denies SI/intent/plan. States that she would never leave her boys. However, pt states that she feels hopeless, emotionally numb, very tired, with poor sleep (says she only gets about 1-2 hours/night). Appetite has been poor, has lost weight. Has been having panic attacks every few months. Has nightmares and flashbacks, exacerbated by friend’s  recent death. No deanne/hypomania/OCD. Discussed tx options; pt states that she had never been open to medications in the past but now feels that she is at the point where she needs treatment. Feels hopeless, unable to function and unable to cope in home environment, seeking inpatient admission.     ROS: Psychiatric and constitutional symptoms as above    PPH: MDD, PTSD, CASEY, no prior hospitalizations, no SA/SIB. No prior medication trials.  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  Legal Hx: denies  Access to firearms: denies  Subs: social alcohol use (1-2 drinks on the weekend w/ friends), no drug use, no cannabis, was smoking cigarettes until 1 month ago and now occasionally vapes      MSE notable for tearful but well-related; speech is normal vol/rate/tone, mood is “depressed,” affect is congruent; thought process is linear, organized, denies SI/HIAVH, no PI/delusions    RISK:  - Modifiable risk factors: s/s of depression and PTSD  - Unmodifiable risk factors: trauma hx, interpersonal stressors  - Protective factors: supportive family, help-seeking, motivated for tx  - Given above, the patient is clinically appropriate for outpatient care. Modifiable risk factors are being addressed as below.    ASSESSMENT:  Pt is a 45y/o F with PTSD, MDD, CASEY, no prior admissions, no SA/SIB, sees therapist, no prior med trials, who presented for inpatient admission for worsening anxiety and PTSD symptoms. Pt was seen in [facility] ED on 1/11, had agreed to admission but then became overwhelmed with the wait and requested to leave. Pt cites worsening depression, anxiety and PTSD symptoms in context of interpersonal stressors, ongoing divorce, death of friend. Tearful, emotionally numb, feels hopeless, ruminating thoughts, tired with poor sleep, nightmares and flashbacks. Feels unable to function, cannot cope in home environment. Pt requesting voluntary inpatient admission.     - Admitted to [address] [place] [address] swab 1/13/2025 was negative"

## 2025-01-16 NOTE — BH INPATIENT PSYCHIATRY DISCHARGE NOTE - NSBHMETABOLIC_PSY_ALL_CORE_FT
BMI: BMI (kg/m2): 32.8 (01-13-25 @ 15:59)  HbA1c: A1C with Estimated Average Glucose Result: 5.4 % (01-13-25 @ 15:30)    Glucose:   BP: --Vital Signs Last 24 Hrs  T(C): 36.8 (01-16-25 @ 08:32), Max: 36.8 (01-16-25 @ 08:32)  T(F): 98.2 (01-16-25 @ 08:32), Max: 98.2 (01-16-25 @ 08:32)  HR: --  BP: --  BP(mean): --  RR: --  SpO2: --    Orthostatic VS  01-16-25 @ 08:32  Lying BP: --/-- HR: --  Sitting BP: 133/83 HR: 89  Standing BP: 124/89 HR: 92  Site: --  Mode: --  Orthostatic VS  01-15-25 @ 07:58  Lying BP: --/-- HR: --  Sitting BP: 130/84 HR: 100  Standing BP: 119/85 HR: 108  Site: --  Mode: --    Lipid Panel: Date/Time: 01-14-25 @ 08:22  Cholesterol, Serum: 209  LDL Cholesterol Calculated: 121  HDL Cholesterol, Serum: 56  Total Cholesterol/HDL Ration Measurement: --  Triglycerides, Serum: 230

## 2025-01-16 NOTE — BH INPATIENT PSYCHIATRY PROGRESS NOTE - NSBHCHARTREVIEWVS_PSY_A_CORE FT
Vital Signs Last 24 Hrs  T(C): 36.8 (01-16-25 @ 08:32), Max: 36.8 (01-16-25 @ 08:32)  T(F): 98.2 (01-16-25 @ 08:32), Max: 98.2 (01-16-25 @ 08:32)  HR: --  BP: --  BP(mean): --  RR: --  SpO2: --    Orthostatic VS  01-16-25 @ 08:32  Lying BP: --/-- HR: --  Sitting BP: 133/83 HR: 89  Standing BP: 124/89 HR: 92  Site: --  Mode: --  Orthostatic VS  01-15-25 @ 07:58  Lying BP: --/-- HR: --  Sitting BP: 130/84 HR: 100  Standing BP: 119/85 HR: 108  Site: --  Mode: --   Vital Signs Last 24 Hrs  T(C): 36.7 (01-16-25 @ 20:18), Max: 36.8 (01-16-25 @ 08:32)  T(F): 98 (01-16-25 @ 20:18), Max: 98.2 (01-16-25 @ 08:32)  HR: 82 (01-16-25 @ 21:21) (81 - 82)  BP: 154/94 (01-16-25 @ 21:21) (153/110 - 154/94)  BP(mean): --  RR: 18 (01-16-25 @ 21:21) (18 - 18)  SpO2: 98% (01-16-25 @ 21:21) (98% - 98%)    Orthostatic VS  01-16-25 @ 08:32  Lying BP: --/-- HR: --  Sitting BP: 133/83 HR: 89  Standing BP: 124/89 HR: 92  Site: --  Mode: --  Orthostatic VS  01-15-25 @ 07:58  Lying BP: --/-- HR: --  Sitting BP: 130/84 HR: 100  Standing BP: 119/85 HR: 108  Site: --  Mode: --

## 2025-01-16 NOTE — BH INPATIENT PSYCHIATRY DISCHARGE NOTE - REASON FOR ADMISSION
anxiety, depression, insomnia in the setting of psychosocial stressors in the community  44 year old female referred from Crisis Clinic for anxiety, depression, insomnia in the setting of psychosocial stressors in the community

## 2025-01-17 VITALS — TEMPERATURE: 98 F

## 2025-01-17 RX ORDER — PRAZOSIN HYDROCHLORIDE 5 MG/1
1 CAPSULE ORAL
Qty: 14 | Refills: 0
Start: 2025-01-17 | End: 2025-01-30

## 2025-01-17 RX ORDER — IRON/LYS/VIT B COMP/FOLIC ACID 800-1MG/15
1 LIQUID (ML) ORAL
Qty: 14 | Refills: 0
Start: 2025-01-17 | End: 2025-01-30

## 2025-01-17 RX ORDER — NICOTINE POLACRILEX 4 MG/1
1 LOZENGE ORAL
Qty: 14 | Refills: 0
Start: 2025-01-17 | End: 2025-01-30

## 2025-01-17 RX ORDER — VENLAFAXINE HYDROCHLORIDE 75 MG/1
1 CAPSULE, EXTENDED RELEASE ORAL
Qty: 14 | Refills: 0
Start: 2025-01-17 | End: 2025-01-30

## 2025-01-17 RX ADMIN — Medication 1 MILLIGRAM(S): at 11:05

## 2025-01-17 RX ADMIN — Medication 1 TABLET(S): at 08:51

## 2025-01-17 RX ADMIN — TRAZODONE HYDROCHLORIDE 50 MILLIGRAM(S): 150 TABLET ORAL at 00:49

## 2025-01-17 RX ADMIN — VENLAFAXINE HYDROCHLORIDE 37.5 MILLIGRAM(S): 75 CAPSULE, EXTENDED RELEASE ORAL at 08:51

## 2025-01-17 NOTE — BH DISCHARGE NOTE NURSING/SOCIAL WORK/PSYCH REHAB - NSCDUDCCRISIS_PSY_A_CORE
[facility] Well  1 [phone]) [facility]-WELL ([phone])  Text "WELL" to 45952  Website: [email or web address]/.[facility] 1 (114) 621-HOPE (3519) Website: www.safehorizon.org/.  [facility] Crisis [facility] for Children, Adults and Families  [address] [place] [address]  Mobile Crisis Hotline – (602) 782-1247/.National Suicide Prevention Lifeline 0 (453) 536-1320/.  [facility]  1 (623) LIFENET (610-1377)/.  [facility]  (927) 977-8289/.  [facility] Behavioral Health Helpline / [facility] Mobile Crisis  (930) 813-BZUM (7251)/.  [facility] Response Crisis Hotline  (314) 633-7104  24 hour telephone crisis intervention and suicide prevention hotline concerned with all mental health issues/.  [facility]  [address] [place] [phone]   Hours:  Monday through Friday from 9 AM to 3 PM/988 Suicide and Crisis Lifeline

## 2025-01-17 NOTE — BH DISCHARGE NOTE NURSING/SOCIAL WORK/PSYCH REHAB - PATIENT PORTAL LINK FT
Can this please be resent as a 90 day supply? You can access the FollowMyHealth Patient Portal offered by [facility] by registering at the following website: [email or web address]. By joining [facility]’s FollowMyHealth portal, you will also be able to view your health information using other applications (apps) compatible with our system.

## 2025-01-17 NOTE — BH DISCHARGE NOTE NURSING/SOCIAL WORK/PSYCH REHAB - NSDCPRGOAL_PSY_ALL_CORE
The patient met her specified goal to identify 3 calming strategies to manage symptoms of trauma for her post-traumatic stress goal. Progress was evidenced by an improved ability to tolerate safety planning and engaging in appropriate and supportive dialogue with peers during communal engagement. The patient attended 1 of the Psychiatric Rehabilitation groups throughout her stay. The writer completed the patient’s safety plan during this session.

## 2025-01-17 NOTE — BH DISCHARGE NOTE NURSING/SOCIAL WORK/PSYCH REHAB - DISCHARGE INSTRUCTIONS AFTERCARE APPOINTMENTS
In order to check the location, date, or time of your aftercare appointment, please refer to your Discharge Instructions Document given to you upon leaving the hospital.  If you have lost the instructions please call [phone]

## 2025-01-21 DIAGNOSIS — F43.10 POST-TRAUMATIC STRESS DISORDER, UNSPECIFIED: ICD-10-CM

## 2025-02-08 NOTE — ED ADULT NURSE NOTE - BP NONINVASIVE DIASTOLIC (MM HG)
Patient discharge instructions reviewed, all additional questions answered, patient verbalizes understanding. IV d/c'ed. VSS.  Patient has all belongings accounted for.  Patient currently denies CP, SOB, HA, N/V/D, dizziness, weakness and vision changes. Patient is ambulatory at baseline gait.  
Pt asked for lido patch prior to discharge. Dr. Urbina called and patch ordered. Patch applied to patient prior to discharge  
Report received from Julissa GURROLA. Pt awake, alert, and oriented x4, resp even and unlabored. Pt resting comfortably on cart. Pt on room air and monitor. 20 G R hand. CT results back and currently waiting for dispo. Side rails x2.  Call light and urinal within reach. All patient's belongings on cart with pt- pt aware and verbally understood. Family at cartside. Pt and family updated on plan of care and verbally understood.  Patient currently denies CP, SOB, HA, N/V/D, dizziness, weakness and vision changes.    Risk  Yes No   Present to ED because of fall  x   Age > 70 x    Altered Mental Status    Intoxicated with Alcohol or    Substance Confusion  x   Impaired Mobility       Ambulance or transfers with  assistive devices or assist    Ambulates with unsteady gait and requires assistance     Unable to ambulate or transfer x    Nursing Judgement (free text)  X-d/t back pain  but able to stand with standby assistance per prior RN      Yes to any risk = high fall risk    [ x] Fall Precautions In Place or Implemented per patients plan of care.  [x ] Pt educated on call light usage, to call RN when needed  [ x] Yellow Non-Skid Socks  [ x] Fall Risk Band  [x ] Bed in Lowest Position  [x ] Side Rail x2  [x ] Call Light Within Reach  [x ] Patient belongings within reach    
83

## 2025-04-17 PROCEDURE — 99213 OFFICE O/P EST LOW 20 MIN: CPT | Mod: 95

## 2025-04-17 PROCEDURE — 90833 PSYTX W PT W E/M 30 MIN: CPT | Mod: 93

## 2025-06-04 PROCEDURE — 90832 PSYTX W PT 30 MINUTES: CPT | Mod: 93

## 2025-06-04 NOTE — ASU PREOP CHECKLIST - NS PREOP CHK [facility] WASH
HISTORY & PHYSICAL       Patient:  Trav Jennings  YOB: 1968  MRN: [id number]     Acct: [id number]    PCP: Radha Hou APRN - CNP    Date of Admission: 6/4/2025    Date of Service: Pt seen/examined in ED with expected LOS greater than two midnights due to medical therapy. Admitted to inpatient    ASSESSMENT/PLAN:    Fluid overload secondary to ESRD: Patient is supposed to get dialysis TTS, and his last attended appointment was Saturday 5/31.  Notes worsening lower extremity edema, shortness of breath, and mild abdominal edema.  Patient would benefit symptomatically from dialysis  Nephrology consulted; appreciate assist  As per nephrology for management of ESRD    Acute on chronic hypoxic respiratory failure: Patient requires supplemental O2 at baseline, patient states that he was unable to get to his home oxygen due to social problems with his wife.  He notes that he missed a dialysis session and has been progressively more short of breath since then.  Continue supplemental O2 via NC, baseline home O2 4 LPM  Likely secondary to above as well as no access to his home O2  Acapella and IS  Pulmonary hygiene    Chronic HFpEF: Last echo 12/6/2024 showed EF 60 to 65%, severe concentric hypertrophy of the left ventricle, MR of MV, MR of TV.  Repeat echo ordered due to shortness of breath  Likely to improve symptomatically with dialysis as above    Hypertensive urgency: Elevated blood pressure in ED, likely secondary to patient not being on his home medications. Patient is supposed to take amlodipine, clonidine, hydralazine, Imdur, and labetalol at home  Resume home medications  Continue to monitor VS  No S/S of end organ damage currently    Paroxysmal atrial fibrillation with secondary hypercoagulable state: [id number] score 3, chronically anticoagulated with Eliquis  Continue Eliquis  Continue labetalol and amiodarone  Continue telemetry    Elevated troponin: Likely secondary to above ESRD, EKG 
Echocardiogram: 2024, 60-65%, severe concentric hypertrophy, abnormal diastolic function.     Assessment and Plan:  Renal - Patient missed dialysis session on Tuesday, last Thursday[facility] No urine produced at baseline.   ESRD on HD, T/T/S.  Anemia of ESRD (chronic microcytic)  Acute Hypox/Hypcap respiratory failure.  Electrolytes   NA/K/CL/CO2/BUN/CR: 139/5.0/97/20/99/15.5;  Anion gap 22  Paroxysmal A-Fib: Cordarone. Renal-dose Eliquis.  HTN Emergency: ISO missed home meds. Resume home Norvasc, Clonidine, Hydralazine, Labetalol. Hold nephrotoxic meds.   HLD  TAA/AAA s/p aortic stent  Polysubstance abuse history.   Meds reviewed and discussed with primary team and patient.    Sulaiman Edwards DO  [facility]    This report has been created using voice recognition software. It may contain minor errors which are inherent in voice recognition technology    
N/A

## 2025-06-26 PROCEDURE — 90832 PSYTX W PT 30 MINUTES: CPT | Mod: 95
